# Patient Record
Sex: MALE | Race: WHITE | NOT HISPANIC OR LATINO | URBAN - METROPOLITAN AREA
[De-identification: names, ages, dates, MRNs, and addresses within clinical notes are randomized per-mention and may not be internally consistent; named-entity substitution may affect disease eponyms.]

---

## 2018-04-18 ENCOUNTER — INPATIENT (INPATIENT)
Facility: HOSPITAL | Age: 33
LOS: 4 days | Discharge: HOME | End: 2018-04-23
Attending: INTERNAL MEDICINE | Admitting: INTERNAL MEDICINE

## 2018-04-18 VITALS
SYSTOLIC BLOOD PRESSURE: 162 MMHG | DIASTOLIC BLOOD PRESSURE: 108 MMHG | HEART RATE: 103 BPM | RESPIRATION RATE: 18 BRPM | WEIGHT: 192.02 LBS | TEMPERATURE: 100 F

## 2018-04-18 DIAGNOSIS — K21.9 GASTRO-ESOPHAGEAL REFLUX DISEASE WITHOUT ESOPHAGITIS: ICD-10-CM

## 2018-04-18 DIAGNOSIS — E66.9 OBESITY, UNSPECIFIED: ICD-10-CM

## 2018-04-18 DIAGNOSIS — F10.20 ALCOHOL DEPENDENCE, UNCOMPLICATED: ICD-10-CM

## 2018-04-18 DIAGNOSIS — F13.20 SEDATIVE, HYPNOTIC OR ANXIOLYTIC DEPENDENCE, UNCOMPLICATED: ICD-10-CM

## 2018-04-18 DIAGNOSIS — I10 ESSENTIAL (PRIMARY) HYPERTENSION: ICD-10-CM

## 2018-04-18 LAB
ALBUMIN SERPL ELPH-MCNC: 4.6 G/DL — SIGNIFICANT CHANGE UP (ref 3.5–5.2)
ALP SERPL-CCNC: 101 U/L — SIGNIFICANT CHANGE UP (ref 30–115)
ALT FLD-CCNC: 129 U/L — HIGH (ref 0–41)
AMMONIA BLD-MCNC: 59 UMOL/L — HIGH (ref 11–55)
AMYLASE P1 CFR SERPL: 65 U/L — SIGNIFICANT CHANGE UP (ref 25–115)
ANION GAP SERPL CALC-SCNC: 17 MMOL/L — HIGH (ref 7–14)
APPEARANCE UR: CLEAR — SIGNIFICANT CHANGE UP
APTT BLD: 32.3 SEC — SIGNIFICANT CHANGE UP (ref 27–39.2)
AST SERPL-CCNC: 141 U/L — HIGH (ref 0–41)
BACTERIA # UR AUTO: (no result)
BASOPHILS # BLD AUTO: 0.05 K/UL — SIGNIFICANT CHANGE UP (ref 0–0.2)
BASOPHILS NFR BLD AUTO: 0.5 % — SIGNIFICANT CHANGE UP (ref 0–1)
BILIRUB SERPL-MCNC: 3 MG/DL — HIGH (ref 0.2–1.2)
BILIRUB UR-MCNC: (no result)
BUN SERPL-MCNC: 11 MG/DL — SIGNIFICANT CHANGE UP (ref 10–20)
CALCIUM SERPL-MCNC: 9.7 MG/DL — SIGNIFICANT CHANGE UP (ref 8.5–10.1)
CHLORIDE SERPL-SCNC: 95 MMOL/L — LOW (ref 98–110)
CHOLEST SERPL-MCNC: 175 MG/DL — SIGNIFICANT CHANGE UP (ref 100–200)
CO2 SERPL-SCNC: 26 MMOL/L — SIGNIFICANT CHANGE UP (ref 17–32)
COD CRY URNS QL: NEGATIVE — SIGNIFICANT CHANGE UP
COLOR SPEC: SIGNIFICANT CHANGE UP
CREAT SERPL-MCNC: 0.8 MG/DL — SIGNIFICANT CHANGE UP (ref 0.7–1.5)
DIFF PNL FLD: NEGATIVE — SIGNIFICANT CHANGE UP
EOSINOPHIL # BLD AUTO: 0 K/UL — SIGNIFICANT CHANGE UP (ref 0–0.7)
EOSINOPHIL NFR BLD AUTO: 0 % — SIGNIFICANT CHANGE UP (ref 0–8)
EPI CELLS # UR: (no result) /HPF
ETHANOL SERPL-MCNC: <10 MG/DL — HIGH
GGT SERPL-CCNC: 1109 U/L — HIGH (ref 1–40)
GLUCOSE SERPL-MCNC: 116 MG/DL — HIGH (ref 70–99)
GLUCOSE UR QL: NEGATIVE MG/DL — SIGNIFICANT CHANGE UP
GRAN CASTS # UR COMP ASSIST: NEGATIVE — SIGNIFICANT CHANGE UP
HCT VFR BLD CALC: 37.6 % — LOW (ref 42–52)
HDLC SERPL-MCNC: 58 MG/DL — SIGNIFICANT CHANGE UP (ref 40–125)
HGB BLD-MCNC: 13.5 G/DL — LOW (ref 14–18)
HIV 1 & 2 AB SERPL IA.RAPID: SIGNIFICANT CHANGE UP
HYALINE CASTS # UR AUTO: (no result) /LPF
IMM GRANULOCYTES NFR BLD AUTO: 0.3 % — SIGNIFICANT CHANGE UP (ref 0.1–0.3)
INR BLD: 1.13 RATIO — SIGNIFICANT CHANGE UP (ref 0.65–1.3)
KETONES UR-MCNC: NEGATIVE — SIGNIFICANT CHANGE UP
LEUKOCYTE ESTERASE UR-ACNC: NEGATIVE — SIGNIFICANT CHANGE UP
LIPID PNL WITH DIRECT LDL SERPL: 95 MG/DL — SIGNIFICANT CHANGE UP (ref 4–129)
LYMPHOCYTES # BLD AUTO: 1.19 K/UL — LOW (ref 1.2–3.4)
LYMPHOCYTES # BLD AUTO: 11.4 % — LOW (ref 20.5–51.1)
MAGNESIUM SERPL-MCNC: 1.8 MG/DL — SIGNIFICANT CHANGE UP (ref 1.8–2.4)
MCHC RBC-ENTMCNC: 35.6 PG — HIGH (ref 27–31)
MCHC RBC-ENTMCNC: 35.9 G/DL — SIGNIFICANT CHANGE UP (ref 32–37)
MCV RBC AUTO: 99.2 FL — HIGH (ref 80–94)
MONOCYTES # BLD AUTO: 0.67 K/UL — HIGH (ref 0.1–0.6)
MONOCYTES NFR BLD AUTO: 6.4 % — SIGNIFICANT CHANGE UP (ref 1.7–9.3)
NEUTROPHILS # BLD AUTO: 8.48 K/UL — HIGH (ref 1.4–6.5)
NEUTROPHILS NFR BLD AUTO: 81.4 % — HIGH (ref 42.2–75.2)
NITRITE UR-MCNC: NEGATIVE — SIGNIFICANT CHANGE UP
NRBC # BLD: 0 /100 WBCS — SIGNIFICANT CHANGE UP (ref 0–0)
PH UR: 7.5 — SIGNIFICANT CHANGE UP (ref 5–8)
PLATELET # BLD AUTO: 249 K/UL — SIGNIFICANT CHANGE UP (ref 130–400)
POTASSIUM SERPL-MCNC: 4.3 MMOL/L — SIGNIFICANT CHANGE UP (ref 3.5–5)
POTASSIUM SERPL-SCNC: 4.3 MMOL/L — SIGNIFICANT CHANGE UP (ref 3.5–5)
PROT SERPL-MCNC: 7.6 G/DL — SIGNIFICANT CHANGE UP (ref 6–8)
PROT UR-MCNC: 30 MG/DL
PROTHROM AB SERPL-ACNC: 12.3 SEC — SIGNIFICANT CHANGE UP (ref 9.95–12.87)
RBC # BLD: 3.79 M/UL — LOW (ref 4.7–6.1)
RBC # FLD: 20.3 % — HIGH (ref 11.5–14.5)
RBC CASTS # UR COMP ASSIST: (no result) /HPF
SODIUM SERPL-SCNC: 138 MMOL/L — SIGNIFICANT CHANGE UP (ref 135–146)
SP GR SPEC: 1.01 — SIGNIFICANT CHANGE UP (ref 1.01–1.03)
TOTAL CHOLESTEROL/HDL RATIO MEASUREMENT: 3 RATIO — LOW (ref 4–5.5)
TRI-PHOS CRY UR QL COMP ASSIST: NEGATIVE — SIGNIFICANT CHANGE UP
TRIGL SERPL-MCNC: 155 MG/DL — HIGH (ref 10–149)
URATE CRY FLD QL MICRO: NEGATIVE — SIGNIFICANT CHANGE UP
UROBILINOGEN FLD QL: 2 MG/DL (ref 0.2–0.2)
WBC # BLD: 10.42 K/UL — SIGNIFICANT CHANGE UP (ref 4.8–10.8)
WBC # FLD AUTO: 10.42 K/UL — SIGNIFICANT CHANGE UP (ref 4.8–10.8)
WBC UR QL: SIGNIFICANT CHANGE UP /HPF

## 2018-04-18 RX ORDER — FOLIC ACID 0.8 MG
1 TABLET ORAL DAILY
Qty: 0 | Refills: 0 | Status: DISCONTINUED | OUTPATIENT
Start: 2018-04-18 | End: 2018-04-23

## 2018-04-18 RX ORDER — PANTOPRAZOLE SODIUM 20 MG/1
20 TABLET, DELAYED RELEASE ORAL
Qty: 0 | Refills: 0 | Status: DISCONTINUED | OUTPATIENT
Start: 2018-04-18 | End: 2018-04-23

## 2018-04-18 RX ORDER — ACETAMINOPHEN 500 MG
650 TABLET ORAL EVERY 6 HOURS
Qty: 0 | Refills: 0 | Status: DISCONTINUED | OUTPATIENT
Start: 2018-04-18 | End: 2018-04-23

## 2018-04-18 RX ORDER — PHENOBARBITAL 60 MG
32.4 TABLET ORAL EVERY 12 HOURS
Qty: 0 | Refills: 0 | Status: DISCONTINUED | OUTPATIENT
Start: 2018-04-22 | End: 2018-04-23

## 2018-04-18 RX ORDER — METOPROLOL TARTRATE 50 MG
50 TABLET ORAL DAILY
Qty: 0 | Refills: 0 | Status: DISCONTINUED | OUTPATIENT
Start: 2018-04-18 | End: 2018-04-23

## 2018-04-18 RX ORDER — PHENOBARBITAL 60 MG
64.8 TABLET ORAL EVERY 6 HOURS
Qty: 0 | Refills: 0 | Status: DISCONTINUED | OUTPATIENT
Start: 2018-04-18 | End: 2018-04-19

## 2018-04-18 RX ORDER — HYDROXYZINE HCL 10 MG
100 TABLET ORAL AT BEDTIME
Qty: 0 | Refills: 0 | Status: DISCONTINUED | OUTPATIENT
Start: 2018-04-18 | End: 2018-04-23

## 2018-04-18 RX ORDER — PHENOBARBITAL 60 MG
32.4 TABLET ORAL EVERY 4 HOURS
Qty: 0 | Refills: 0 | Status: DISCONTINUED | OUTPATIENT
Start: 2018-04-18 | End: 2018-04-23

## 2018-04-18 RX ORDER — PHENOBARBITAL 60 MG
TABLET ORAL
Qty: 0 | Refills: 0 | Status: COMPLETED | OUTPATIENT
Start: 2018-04-18 | End: 2018-04-23

## 2018-04-18 RX ORDER — PHENOBARBITAL 60 MG
48.6 TABLET ORAL EVERY 6 HOURS
Qty: 0 | Refills: 0 | Status: DISCONTINUED | OUTPATIENT
Start: 2018-04-19 | End: 2018-04-20

## 2018-04-18 RX ORDER — HYDROXYZINE HCL 10 MG
50 TABLET ORAL EVERY 6 HOURS
Qty: 0 | Refills: 0 | Status: DISCONTINUED | OUTPATIENT
Start: 2018-04-18 | End: 2018-04-23

## 2018-04-18 RX ORDER — IBUPROFEN 200 MG
400 TABLET ORAL EVERY 6 HOURS
Qty: 0 | Refills: 0 | Status: DISCONTINUED | OUTPATIENT
Start: 2018-04-18 | End: 2018-04-23

## 2018-04-18 RX ORDER — THIAMINE MONONITRATE (VIT B1) 100 MG
100 TABLET ORAL DAILY
Qty: 0 | Refills: 0 | Status: DISCONTINUED | OUTPATIENT
Start: 2018-04-18 | End: 2018-04-23

## 2018-04-18 RX ORDER — PHENOBARBITAL 60 MG
32.4 TABLET ORAL EVERY 6 HOURS
Qty: 0 | Refills: 0 | Status: DISCONTINUED | OUTPATIENT
Start: 2018-04-20 | End: 2018-04-21

## 2018-04-18 RX ADMIN — Medication 64.8 MILLIGRAM(S): at 17:07

## 2018-04-18 RX ADMIN — Medication 0.1 MILLIGRAM(S): at 17:08

## 2018-04-18 NOTE — H&P ADULT - HISTORY OF PRESENT ILLNESS
DRUG	AGE OF ONSET	ROUTE	FREQ	AMOUNT	LAST USE	LENGTH OF CURRENT USE	  Xanax	20 Y/O	Po	Daily	5 Sticks	4/18/18 4 Sticks	4 years	  Alcohol	20 Y/O	Po	Daily	½ Bottle Rum	6 Shots	4 Years	  							  							  							      This is 33 Y/O male with Hx of Continous Benzo & Alcohol Dependency.Negative Detox,Rehab,or OPDNegative hx of IVDA  Hx x of Withdrawal Seizures: No   Psyhx: Denies              Denies any S/H Ideation or A/V Hallucination  Screening history	Last tested	Result	History of treatment	  HIV	2017	NEG	N/A	  Hepatitis C	2017	NEG	N/A	  Quantiferon GOLD TB test	2017	NEG	N/A

## 2018-04-18 NOTE — H&P ADULT - NSHPPHYSICALEXAM_GEN_ALL_CORE
-  Vital Signs:      Temp:   99.2   Pulse: 98      RR:  16     BP:  154/98                      Constitutional: anxious A&Ox3, WD/WN  Eyes: PERRLA  Respiratory: +air entry, no rales, no rhonchi, no wheezes  Cardiovascular: +S1 and S2,RRR  Gastrointestinal: +BS, soft, non-tender, not distended, No CVAT  Extremities: no cyanosis, no edema, no calf tenderness,   Vascular: +dorsal pedis and radial pulses, no extremity cyanosis  Neurological: sensation intact, ROM equal B/L, CN II-XII intact, Gait: steady  Skin: no rashes, normal turgor, No track marks ,(+)Abdominal Straie  No Decubiti present  No IV lines present  Rectal/Breasts Exam: Deferred

## 2018-04-18 NOTE — H&P ADULT - PROBLEM SELECTOR PLAN 3
Heart Healthy Diet  Monitor BP q6h  Clonidine 0.1mg PO Q6H PRN for BP >140/90, hold BP <90/60  Continue Home Meds:  Metoprolol 50 Mg po daily

## 2018-04-18 NOTE — H&P ADULT - NSHPREVIEWOFSYSTEMS_GEN_ALL_CORE
REVIEW OF SYSTEMS:    CONSTITUTIONAL: +loss appetitie, +hot and chill intermittently, No weakness or fevers, No weight loss  PAIN (1 to 10 scles):   SLEEPING HABITS: +Insomnia, No KLEBER, Narcolepsy, or Somnambulism, Resltess leg  SKIN: No itching, rashes. pruritus, dryness, hair or nail changes.  EYES/ENT: No visual changes;  No vertigo or throat pain   NECK: No pain or stiffness  LYMPH NODES: No enlarged glands  RESPIRATORY: No cough, wheezing, hemoptysis; No shortness of breath  CARDIOVASCULAR: No chest pain or palpitations  BREASTS: No pain, masses, or nipple discharge   ENDOCRINE: No heat or cold intolerance; No hair loss  GASTROINTESTINAL: No Nausea or diarrhea in earlier, No abdominal pain. No vomiting, or hematemesis;  No melena or    hematochezia.  GENITOURINARY: No dysuria, frequency or hematuria, No penile discharge or testicular pain  NEUROLOGICAL: No numbness or weakness  MUSCULOSKELETAL: No arthritis, +myalgias, No joint and muscle stiffness  PSYCHIATRIC: Denies any Psych. Hx No mood swings, or difficulty sleeping, Denies S/H ideation, Denies AVH  Others:

## 2018-04-18 NOTE — H&P ADULT - ATTENDING COMMENTS
Patient interviewed and examined.    Chart reviewed.    PA's H&P noted and modified, as appropriate.    Case discussed on team rounds    Following is my summary of the case.    Admitted for detox: from ____ED, _x__Intake, ____Med/Surg Floor    Alcohol_x___   Opioid_____  Benzo_x__ Other_____    Substance amount, duration of use, last usage, and prior attempts at detox or rehabs, are outlined above in the H&P and discussed with patient.    Associated withdrawal symptoms presents.  Comorbid conditions noted. Chronic and Stable.    Past Medical Hx, Psych Hx, family Hx, Social Hx from H&P reviewed and NO changes.    Old medical record and medication Hx. Reviewed    Following items reviewed and addressed:  1. labs  2. EKG  3. Imaging from PACs module    Examination: no change from PA's exam.    Place on following protocol  __x___Medically Managed  ____Medically Supervised    Ciwa_____Librium taper____Ativan taper___Methadone taper___ Phenobarb taper_x___ Suboxone Induction____MMTP____    Narcan Kit Offered    Psych Consult __X__N/A  ___Ordered    Physical Therapy  ___X  N/A  ___  Ordered    Aftercare disposition to be addressed by counselors.    Estimated length of stay 3-5 days.

## 2018-04-19 LAB
AMPHET UR-MCNC: NEGATIVE — SIGNIFICANT CHANGE UP
BARBITURATES UR SCN-MCNC: NEGATIVE — SIGNIFICANT CHANGE UP
BENZODIAZ UR-MCNC: POSITIVE
COCAINE METAB.OTHER UR-MCNC: NEGATIVE — SIGNIFICANT CHANGE UP
DRUG SCREEN 1, URINE RESULT: SIGNIFICANT CHANGE UP
ESTIMATED AVERAGE GLUCOSE: 123 MG/DL — HIGH (ref 68–114)
HAV IGM SER-ACNC: SIGNIFICANT CHANGE UP
HBA1C BLD-MCNC: 5.9 % — HIGH (ref 4–5.6)
HBV CORE IGM SER-ACNC: SIGNIFICANT CHANGE UP
HBV SURFACE AG SER-ACNC: SIGNIFICANT CHANGE UP
HCV AB S/CO SERPL IA: 0.15 S/CO — SIGNIFICANT CHANGE UP
HCV AB SERPL-IMP: SIGNIFICANT CHANGE UP
METHADONE UR-MCNC: NEGATIVE — SIGNIFICANT CHANGE UP
OPIATES UR-MCNC: NEGATIVE — SIGNIFICANT CHANGE UP
PCP UR-MCNC: NEGATIVE — SIGNIFICANT CHANGE UP
PROPOXYPHENE QUALITATIVE URINE RESULT: NEGATIVE — SIGNIFICANT CHANGE UP
T PALLIDUM AB TITR SER: NEGATIVE — SIGNIFICANT CHANGE UP
THC UR QL: NEGATIVE — SIGNIFICANT CHANGE UP

## 2018-04-19 RX ADMIN — Medication 50 MILLIGRAM(S): at 09:15

## 2018-04-19 RX ADMIN — Medication 48.6 MILLIGRAM(S): at 17:34

## 2018-04-19 RX ADMIN — PANTOPRAZOLE SODIUM 20 MILLIGRAM(S): 20 TABLET, DELAYED RELEASE ORAL at 05:49

## 2018-04-19 RX ADMIN — Medication 64.8 MILLIGRAM(S): at 00:02

## 2018-04-19 RX ADMIN — Medication 100 MILLIGRAM(S): at 09:15

## 2018-04-19 RX ADMIN — Medication 32.4 MILLIGRAM(S): at 22:11

## 2018-04-19 RX ADMIN — Medication 1 TABLET(S): at 09:15

## 2018-04-19 RX ADMIN — Medication 1 MILLIGRAM(S): at 09:14

## 2018-04-19 RX ADMIN — Medication 48.6 MILLIGRAM(S): at 05:49

## 2018-04-19 RX ADMIN — Medication 48.6 MILLIGRAM(S): at 12:01

## 2018-04-19 RX ADMIN — Medication 100 MILLIGRAM(S): at 00:02

## 2018-04-20 RX ADMIN — Medication 50 MILLIGRAM(S): at 08:53

## 2018-04-20 RX ADMIN — Medication 32.4 MILLIGRAM(S): at 16:23

## 2018-04-20 RX ADMIN — PANTOPRAZOLE SODIUM 20 MILLIGRAM(S): 20 TABLET, DELAYED RELEASE ORAL at 05:48

## 2018-04-20 RX ADMIN — Medication 32.4 MILLIGRAM(S): at 05:47

## 2018-04-20 RX ADMIN — Medication 100 MILLIGRAM(S): at 08:53

## 2018-04-20 RX ADMIN — Medication 0.1 MILLIGRAM(S): at 20:46

## 2018-04-20 RX ADMIN — Medication 32.4 MILLIGRAM(S): at 22:20

## 2018-04-20 RX ADMIN — Medication 32.4 MILLIGRAM(S): at 12:29

## 2018-04-20 RX ADMIN — Medication 100 MILLIGRAM(S): at 00:20

## 2018-04-20 RX ADMIN — Medication 1 MILLIGRAM(S): at 08:53

## 2018-04-20 RX ADMIN — Medication 48.6 MILLIGRAM(S): at 00:19

## 2018-04-20 RX ADMIN — Medication 32.4 MILLIGRAM(S): at 17:31

## 2018-04-20 RX ADMIN — Medication 30 MILLILITER(S): at 18:17

## 2018-04-20 RX ADMIN — Medication 1 TABLET(S): at 08:53

## 2018-04-20 NOTE — CHART NOTE - NSCHARTNOTEFT_GEN_A_CORE
Subsequent Inpatient Encounter                                       Detox Unit    OMAR MONACO   32y   Male      Chief Complaint:    Follow up for Benzodiazipine  Dependency    HPI:     I reviewed previous notes.No Change, except if noted below.             Detail:_    ROS:   I reviewed with patient.  No changes from previous notes except if noted below.             Detail: _    PFSH I reviewed with patient. No changes from previous notes except if noted below.             Detail_    Medication reconciliation performed.    MEDICATIONS  (STANDING):  folic acid 1 milliGRAM(s) Oral daily  metoprolol succinate ER 50 milliGRAM(s) Oral daily  multivitamin 1 Tablet(s) Oral daily  pantoprazole    Tablet 20 milliGRAM(s) Oral before breakfast  PHENobarbital   Oral   PHENobarbital 32.4 milliGRAM(s) Oral every 6 hours  thiamine 100 milliGRAM(s) Oral daily      MEDICATIONS  (PRN):  acetaminophen   Tablet 650 milliGRAM(s) Oral every 6 hours PRN For Temp greater than 38.5 C (101.3 F)  acetaminophen   Tablet. 650 milliGRAM(s) Oral every 6 hours PRN Severe Pain (7 - 10)  aluminum hydroxide/magnesium hydroxide/simethicone Suspension 30 milliLiter(s) Oral every 4 hours PRN Dyspepsia  cloNIDine 0.1 milliGRAM(s) Oral every 6 hours PRN for BP >140/90  hydrOXYzine hydrochloride 50 milliGRAM(s) Oral every 6 hours PRN Anxiety  hydrOXYzine hydrochloride 100 milliGRAM(s) Oral at bedtime PRN Insomnia  ibuprofen  Tablet 400 milliGRAM(s) Oral every 6 hours PRN Mild pain  PHENobarbital 32.4 milliGRAM(s) Oral every 4 hours PRN Withdrawal      T(C): 35.9 (18 @ 06:19), Max: 37 (18 @ 00:07)  HR: 80 (18 @ 06:19) (80 - 101)  BP: 128/77 (18 @ 06:19) (128/77 - 134/97)  RR: 16 (18 @ 06:19) (16 - 17)  SpO2: --    PHYSICAL EXAM:      Constitutional: NAD, A&O x3    Eyes: PERRLA, no conjuctivitis    Neck: no lymphadenopathy    Respiratory: +air entry, no rales, no rhonchi, no wheezes    Cardiovascular: +S1 and S2, regular rate and rhythm    Gastrointestinal: +BS, soft, non-tender, not distended    Extremities:  no edema, no calf tenderness    Skin: no rashes, normal turgor                            13.5   10.42 )-----------( 249      ( 2018 18:51 )             37.6       138  |  95<L>  |  11  ----------------------------<  116<H>  4.3   |  26  |  0.8    Ca    9.7      2018 18:51  Mg     1.8         TPro  7.6  /  Alb  4.6  /  TBili  3.0<H>  /  DBili  x   /  AST  141<H>  /  ALT  129<H>  /  AlkPhos  101    PT/INR - ( 2018 18:51 )   PT: 12.30 sec;   INR: 1.13 ratio         PTT - ( 2018 18:51 )  PTT:32.3 sec  Magnesium, Serum: 1.8 mg/dL (18 @ 18:51)  Ammonia, Serum: 59 umol/L (18 @ 18:51)  Amylase, Serum Total: 65 U/L (18 @ 18:51)  Hemoglobin A1C, Whole Blood: 5.9 % (18 @ 18:51)  Treponema Pallidum Antibody Interpretation: Negative (18 @ 18:51)  Hepatitis B Surface Antigen: Nonreact (18 @ 18:51)  Hepatitis C Virus S/CO Ratio: 0.15 S/CO (18 @ 18:51)    Hepatitis C Virus Interpretation: Nonreact (18 @ 18:51)      Urinalysis Basic - ( 2018 16:24 )    Color: Erica / Appearance: Clear / S.015 / pH: x  Gluc: x / Ketone: Negative  / Bili: Small / Urobili: 2.0 mg/dL   Blood: x / Protein: 30 mg/dL / Nitrite: Negative   Leuk Esterase: Negative / RBC: 5-10 /HPF / WBC 3-5 /HPF   Sq Epi: x / Non Sq Epi: Few /HPF / Bacteria: Few    Drug Screen 1, Urine Result: Done (18 @ 16:24)        Impression and Plan:    Primary Diagnosis:  Benzodiazipine Dependency                                Medication: Phenobarbitol Protocol    Secondary Diagnosis:                                                                                Medication:    Tertiary Diagnosis:                                                                                     Medication:      Continue Detox Protocols. Use of PRNS as needed for withdrawal and comfort.    Adjustments to protocols:    Labs/ Tests reviewed.    Tests ordered:     Likely Disposition: _X__Home       ___Rehab       ___Outpatient Program    ___Self Help     _____Other    Estimated Length of stay:_5___

## 2018-04-21 LAB
M TB TUBERC IFN-G BLD QL: 0 IU/ML — SIGNIFICANT CHANGE UP
M TB TUBERC IFN-G BLD QL: 0.02 IU/ML — SIGNIFICANT CHANGE UP
M TB TUBERC IFN-G BLD QL: NEGATIVE — SIGNIFICANT CHANGE UP
MITOGEN IGNF BCKGRD COR BLD-ACNC: 1.28 IU/ML — SIGNIFICANT CHANGE UP

## 2018-04-21 RX ORDER — GABAPENTIN 400 MG/1
300 CAPSULE ORAL THREE TIMES A DAY
Qty: 0 | Refills: 0 | Status: DISCONTINUED | OUTPATIENT
Start: 2018-04-21 | End: 2018-04-23

## 2018-04-21 RX ADMIN — Medication 50 MILLIGRAM(S): at 17:30

## 2018-04-21 RX ADMIN — Medication 100 MILLIGRAM(S): at 00:16

## 2018-04-21 RX ADMIN — GABAPENTIN 300 MILLIGRAM(S): 400 CAPSULE ORAL at 08:44

## 2018-04-21 RX ADMIN — Medication 32.4 MILLIGRAM(S): at 06:34

## 2018-04-21 RX ADMIN — Medication 1 MILLIGRAM(S): at 08:27

## 2018-04-21 RX ADMIN — PANTOPRAZOLE SODIUM 20 MILLIGRAM(S): 20 TABLET, DELAYED RELEASE ORAL at 06:34

## 2018-04-21 RX ADMIN — Medication 1 TABLET(S): at 08:27

## 2018-04-21 RX ADMIN — Medication 32.4 MILLIGRAM(S): at 17:30

## 2018-04-21 RX ADMIN — Medication 32.4 MILLIGRAM(S): at 00:16

## 2018-04-21 RX ADMIN — Medication 100 MILLIGRAM(S): at 08:27

## 2018-04-21 RX ADMIN — Medication 32.4 MILLIGRAM(S): at 12:29

## 2018-04-21 RX ADMIN — Medication 50 MILLIGRAM(S): at 08:27

## 2018-04-21 RX ADMIN — GABAPENTIN 300 MILLIGRAM(S): 400 CAPSULE ORAL at 21:14

## 2018-04-21 RX ADMIN — GABAPENTIN 300 MILLIGRAM(S): 400 CAPSULE ORAL at 12:29

## 2018-04-21 NOTE — CHART NOTE - NSCHARTNOTEFT_GEN_A_CORE
Subsequent Inpatient Encounter                                       Detox Unit    OMAR MONACO   32y   Male      Chief Complaint:    Follow up for Benzodiazipine  Dependency    HPI:     I reviewed previous notes.No Change, except if noted below.             Detail:_    ROS:   I reviewed with patient.  No changes from previous notes except if noted below.             Detail: _    PFSH I reviewed with patient. No changes from previous notes except if noted below.             Detail_    Medication reconciliation performed.    MEDICATIONS  (STANDING):  folic acid 1 milliGRAM(s) Oral daily  metoprolol succinate ER 50 milliGRAM(s) Oral daily  multivitamin 1 Tablet(s) Oral daily  pantoprazole    Tablet 20 milliGRAM(s) Oral before breakfast  PHENobarbital   Oral   PHENobarbital 32.4 milliGRAM(s) Oral every 6 hours  thiamine 100 milliGRAM(s) Oral daily      MEDICATIONS  (PRN):  acetaminophen   Tablet 650 milliGRAM(s) Oral every 6 hours PRN For Temp greater than 38.5 C (101.3 F)  acetaminophen   Tablet. 650 milliGRAM(s) Oral every 6 hours PRN Severe Pain (7 - 10)  aluminum hydroxide/magnesium hydroxide/simethicone Suspension 30 milliLiter(s) Oral every 4 hours PRN Dyspepsia  cloNIDine 0.1 milliGRAM(s) Oral every 6 hours PRN for BP >140/90  hydrOXYzine hydrochloride 50 milliGRAM(s) Oral every 6 hours PRN Anxiety  hydrOXYzine hydrochloride 100 milliGRAM(s) Oral at bedtime PRN Insomnia  ibuprofen  Tablet 400 milliGRAM(s) Oral every 6 hours PRN Mild pain  PHENobarbital 32.4 milliGRAM(s) Oral every 4 hours PRN Withdrawal      T(C): 35.9 (18 @ 06:19), Max: 37 (18 @ 00:07)  HR: 80 (18 @ 06:19) (80 - 101)  BP: 128/77 (18 @ 06:19) (128/77 - 134/97)  RR: 16 (18 @ 06:19) (16 - 17)  SpO2: --    PHYSICAL EXAM:      Constitutional: NAD, A&O x3    Eyes: PERRLA, no conjuctivitis    Neck: no lymphadenopathy    Respiratory: +air entry, no rales, no rhonchi, no wheezes    Cardiovascular: +S1 and S2, regular rate and rhythm    Gastrointestinal: +BS, soft, non-tender, not distended    Extremities:  no edema, no calf tenderness    Skin: no rashes, normal turgor                            13.5   10.42 )-----------( 249      ( 2018 18:51 )             37.6       138  |  95<L>  |  11  ----------------------------<  116<H>  4.3   |  26  |  0.8    Ca    9.7      2018 18:51  Mg     1.8         TPro  7.6  /  Alb  4.6  /  TBili  3.0<H>  /  DBili  x   /  AST  141<H>  /  ALT  129<H>  /  AlkPhos  101    PT/INR - ( 2018 18:51 )   PT: 12.30 sec;   INR: 1.13 ratio         PTT - ( 2018 18:51 )  PTT:32.3 sec  Magnesium, Serum: 1.8 mg/dL (18 @ 18:51)  Ammonia, Serum: 59 umol/L (18 @ 18:51)  Amylase, Serum Total: 65 U/L (18 @ 18:51)  Hemoglobin A1C, Whole Blood: 5.9 % (18 @ 18:51)  Treponema Pallidum Antibody Interpretation: Negative (18 @ 18:51)  Hepatitis B Surface Antigen: Nonreact (18 @ 18:51)  Hepatitis C Virus S/CO Ratio: 0.15 S/CO (18 @ 18:51)    Hepatitis C Virus Interpretation: Nonreact (18 @ 18:51)      Urinalysis Basic - ( 2018 16:24 )    Color: Erica / Appearance: Clear / S.015 / pH: x  Gluc: x / Ketone: Negative  / Bili: Small / Urobili: 2.0 mg/dL   Blood: x / Protein: 30 mg/dL / Nitrite: Negative   Leuk Esterase: Negative / RBC: 5-10 /HPF / WBC 3-5 /HPF   Sq Epi: x / Non Sq Epi: Few /HPF / Bacteria: Few    Drug Screen 1, Urine Result: Done (18 @ 16:24)        Impression and Plan:    Primary Diagnosis:  Benzodiazipine Dependency                                Medication: Phenobarbitol Protocol    Secondary Diagnosis:        Anxiety/WD                                                    Medication: trial gabpentin    Tertiary Diagnosis:                                                                                     Medication:      Continue Detox Protocols. Use of PRNS as needed for withdrawal and comfort.    Adjustments to protocols:    Labs/ Tests reviewed.    Tests ordered:     Likely Disposition: _X__Home       ___Rehab       ___Outpatient Program    ___Self Help     _____Other    Estimated Length of stay:_5___

## 2018-04-22 RX ORDER — PANTOPRAZOLE SODIUM 20 MG/1
1 TABLET, DELAYED RELEASE ORAL
Qty: 0 | Refills: 0 | COMMUNITY

## 2018-04-22 RX ORDER — GABAPENTIN 400 MG/1
1 CAPSULE ORAL
Qty: 90 | Refills: 0 | OUTPATIENT
Start: 2018-04-22

## 2018-04-22 RX ORDER — METOPROLOL TARTRATE 50 MG
1 TABLET ORAL
Qty: 0 | Refills: 0 | COMMUNITY
Start: 2018-04-22

## 2018-04-22 RX ORDER — PANTOPRAZOLE SODIUM 20 MG/1
1 TABLET, DELAYED RELEASE ORAL
Qty: 0 | Refills: 0 | COMMUNITY
Start: 2018-04-22

## 2018-04-22 RX ORDER — METOPROLOL TARTRATE 50 MG
1 TABLET ORAL
Qty: 0 | Refills: 0 | COMMUNITY

## 2018-04-22 RX ADMIN — Medication 1 TABLET(S): at 08:21

## 2018-04-22 RX ADMIN — Medication 0.1 MILLIGRAM(S): at 00:39

## 2018-04-22 RX ADMIN — Medication 32.4 MILLIGRAM(S): at 05:58

## 2018-04-22 RX ADMIN — Medication 100 MILLIGRAM(S): at 08:21

## 2018-04-22 RX ADMIN — Medication 50 MILLIGRAM(S): at 14:11

## 2018-04-22 RX ADMIN — Medication 100 MILLIGRAM(S): at 00:39

## 2018-04-22 RX ADMIN — PANTOPRAZOLE SODIUM 20 MILLIGRAM(S): 20 TABLET, DELAYED RELEASE ORAL at 06:57

## 2018-04-22 RX ADMIN — GABAPENTIN 300 MILLIGRAM(S): 400 CAPSULE ORAL at 12:03

## 2018-04-22 RX ADMIN — Medication 1 MILLIGRAM(S): at 08:21

## 2018-04-22 RX ADMIN — GABAPENTIN 300 MILLIGRAM(S): 400 CAPSULE ORAL at 20:25

## 2018-04-22 RX ADMIN — Medication 50 MILLIGRAM(S): at 08:21

## 2018-04-22 RX ADMIN — Medication 32.4 MILLIGRAM(S): at 17:41

## 2018-04-22 RX ADMIN — GABAPENTIN 300 MILLIGRAM(S): 400 CAPSULE ORAL at 08:21

## 2018-04-22 NOTE — CHART NOTE - NSCHARTNOTEFT_GEN_A_CORE
Allergies:  penicillins      Diet: Regular    Activity: as tolerated    Follow up with    1. PMD in 2 weeks    2. Psych in 2 weeks    3.    Follow up for abnormal labs/tests    1.    Extra Instructions:      Flu Vaccine given  Yes_____         No______      Diagnosis:  Chemical Dependency   Maintain sobriety  refrain from all use      Patient Signature___________________________________________  Date_________________      Nurse Signature_____________________________________________Date_________________

## 2018-04-23 ENCOUNTER — INPATIENT (INPATIENT)
Facility: HOSPITAL | Age: 33
LOS: 2 days | Discharge: PSYCHIATRIC OR SUBSTANCE USE FACILITY | End: 2018-04-26
Attending: INTERNAL MEDICINE | Admitting: INTERNAL MEDICINE

## 2018-04-23 VITALS
HEIGHT: 72 IN | WEIGHT: 192.02 LBS | RESPIRATION RATE: 16 BRPM | HEART RATE: 94 BPM | TEMPERATURE: 99 F | SYSTOLIC BLOOD PRESSURE: 154 MMHG | DIASTOLIC BLOOD PRESSURE: 99 MMHG

## 2018-04-23 VITALS
TEMPERATURE: 96 F | DIASTOLIC BLOOD PRESSURE: 89 MMHG | SYSTOLIC BLOOD PRESSURE: 140 MMHG | HEART RATE: 84 BPM | RESPIRATION RATE: 16 BRPM

## 2018-04-23 DIAGNOSIS — K21.9 GASTRO-ESOPHAGEAL REFLUX DISEASE WITHOUT ESOPHAGITIS: ICD-10-CM

## 2018-04-23 DIAGNOSIS — I10 ESSENTIAL (PRIMARY) HYPERTENSION: ICD-10-CM

## 2018-04-23 DIAGNOSIS — F19.10 OTHER PSYCHOACTIVE SUBSTANCE ABUSE, UNCOMPLICATED: ICD-10-CM

## 2018-04-23 DIAGNOSIS — F41.9 ANXIETY DISORDER, UNSPECIFIED: ICD-10-CM

## 2018-04-23 DIAGNOSIS — F11.20 OPIOID DEPENDENCE, UNCOMPLICATED: ICD-10-CM

## 2018-04-23 PROBLEM — F10.20 ALCOHOL DEPENDENCE, UNCOMPLICATED: Chronic | Status: ACTIVE | Noted: 2018-04-18

## 2018-04-23 PROBLEM — E66.9 OBESITY, UNSPECIFIED: Chronic | Status: ACTIVE | Noted: 2018-04-18

## 2018-04-23 RX ORDER — ACETAMINOPHEN 500 MG
650 TABLET ORAL EVERY 6 HOURS
Qty: 0 | Refills: 0 | Status: DISCONTINUED | OUTPATIENT
Start: 2018-04-23 | End: 2018-04-26

## 2018-04-23 RX ORDER — PANTOPRAZOLE SODIUM 20 MG/1
40 TABLET, DELAYED RELEASE ORAL
Qty: 0 | Refills: 0 | Status: DISCONTINUED | OUTPATIENT
Start: 2018-04-23 | End: 2018-04-26

## 2018-04-23 RX ORDER — METOPROLOL TARTRATE 50 MG
50 TABLET ORAL DAILY
Qty: 0 | Refills: 0 | Status: DISCONTINUED | OUTPATIENT
Start: 2018-04-23 | End: 2018-04-25

## 2018-04-23 RX ORDER — GABAPENTIN 400 MG/1
1 CAPSULE ORAL
Qty: 0 | Refills: 0 | COMMUNITY
Start: 2018-04-23

## 2018-04-23 RX ORDER — GABAPENTIN 400 MG/1
300 CAPSULE ORAL THREE TIMES A DAY
Qty: 0 | Refills: 0 | Status: DISCONTINUED | OUTPATIENT
Start: 2018-04-23 | End: 2018-04-26

## 2018-04-23 RX ORDER — GABAPENTIN 400 MG/1
300 CAPSULE ORAL ONCE
Qty: 0 | Refills: 0 | Status: COMPLETED | OUTPATIENT
Start: 2018-04-23 | End: 2018-04-23

## 2018-04-23 RX ORDER — HYDROXYZINE HCL 10 MG
100 TABLET ORAL AT BEDTIME
Qty: 0 | Refills: 0 | Status: DISCONTINUED | OUTPATIENT
Start: 2018-04-23 | End: 2018-04-26

## 2018-04-23 RX ORDER — PANTOPRAZOLE SODIUM 20 MG/1
40 TABLET, DELAYED RELEASE ORAL
Qty: 0 | Refills: 0 | Status: DISCONTINUED | OUTPATIENT
Start: 2018-04-23 | End: 2018-04-23

## 2018-04-23 RX ORDER — IBUPROFEN 200 MG
600 TABLET ORAL EVERY 8 HOURS
Qty: 0 | Refills: 0 | Status: DISCONTINUED | OUTPATIENT
Start: 2018-04-23 | End: 2018-04-26

## 2018-04-23 RX ADMIN — PANTOPRAZOLE SODIUM 40 MILLIGRAM(S): 20 TABLET, DELAYED RELEASE ORAL at 09:00

## 2018-04-23 RX ADMIN — Medication 50 MILLIGRAM(S): at 09:00

## 2018-04-23 RX ADMIN — Medication 100 MILLIGRAM(S): at 00:44

## 2018-04-23 RX ADMIN — Medication 600 MILLIGRAM(S): at 20:59

## 2018-04-23 RX ADMIN — Medication 100 MILLIGRAM(S): at 09:00

## 2018-04-23 RX ADMIN — PANTOPRAZOLE SODIUM 20 MILLIGRAM(S): 20 TABLET, DELAYED RELEASE ORAL at 06:04

## 2018-04-23 RX ADMIN — GABAPENTIN 300 MILLIGRAM(S): 400 CAPSULE ORAL at 09:00

## 2018-04-23 RX ADMIN — Medication 100 MILLIGRAM(S): at 23:07

## 2018-04-23 RX ADMIN — Medication 1 TABLET(S): at 09:00

## 2018-04-23 RX ADMIN — GABAPENTIN 300 MILLIGRAM(S): 400 CAPSULE ORAL at 14:45

## 2018-04-23 RX ADMIN — Medication 650 MILLIGRAM(S): at 14:48

## 2018-04-23 RX ADMIN — Medication 1 MILLIGRAM(S): at 09:00

## 2018-04-23 RX ADMIN — Medication 32.4 MILLIGRAM(S): at 06:04

## 2018-04-23 RX ADMIN — GABAPENTIN 300 MILLIGRAM(S): 400 CAPSULE ORAL at 20:58

## 2018-04-23 NOTE — H&P ADULT - NSHPPHYSICALEXAM_GEN_ALL_CORE
GENERAL:  31y/o Male NAD, resting comfortably.  HEAD:  Atraumatic, Normocephalic  EYES: EOMI, PERRLA, conjunctiva and sclera clear  NECK: Supple, No JVD, no cervical lymphadenopathy, non-tender  CHEST/LUNG: Clear to auscultation bilaterally; No wheeze, rhonchi, or rales  HEART: Regular rate and rhythm; S1&S2  ABDOMEN: Soft, Nontender, Nondistended x 4 quadrants; Bowel sounds present  EXTREMITIES:   Peripheral Pulses Present, No clubbing, no cyanosis, or no edema, no calf tenderness  PSYCH: AAOx3, cooperative, appropriate  NEUROLOGY: WNL  SKIN: WNL

## 2018-04-23 NOTE — H&P ADULT - HISTORY OF PRESENT ILLNESS
33 y/o male presents to rehab unit from inpatient detox. Patient states abusing ETOH and BZD for 3-4 months daily.   patient states abusing about a pint of alcohol daily and 3-5 xanax daily. Patient denies Seizure, denies DAVIDA, denies tremors.  Patient denies any new medical complaints. No N/V. No chest pain. No dyspnea.

## 2018-04-24 RX ADMIN — Medication 50 MILLIGRAM(S): at 08:25

## 2018-04-24 RX ADMIN — GABAPENTIN 300 MILLIGRAM(S): 400 CAPSULE ORAL at 08:25

## 2018-04-24 RX ADMIN — Medication 100 MILLIGRAM(S): at 22:56

## 2018-04-24 RX ADMIN — PANTOPRAZOLE SODIUM 40 MILLIGRAM(S): 20 TABLET, DELAYED RELEASE ORAL at 08:25

## 2018-04-24 RX ADMIN — GABAPENTIN 300 MILLIGRAM(S): 400 CAPSULE ORAL at 21:27

## 2018-04-24 RX ADMIN — GABAPENTIN 300 MILLIGRAM(S): 400 CAPSULE ORAL at 15:31

## 2018-04-25 DIAGNOSIS — F10.20 ALCOHOL DEPENDENCE, UNCOMPLICATED: ICD-10-CM

## 2018-04-25 DIAGNOSIS — F32.3 MAJOR DEPRESSIVE DISORDER, SINGLE EPISODE, SEVERE WITH PSYCHOTIC FEATURES: ICD-10-CM

## 2018-04-25 DIAGNOSIS — F13.20 SEDATIVE, HYPNOTIC OR ANXIOLYTIC DEPENDENCE, UNCOMPLICATED: ICD-10-CM

## 2018-04-25 RX ORDER — METOPROLOL TARTRATE 50 MG
100 TABLET ORAL DAILY
Qty: 0 | Refills: 0 | Status: DISCONTINUED | OUTPATIENT
Start: 2018-04-25 | End: 2018-04-26

## 2018-04-25 RX ORDER — FLUOXETINE HCL 10 MG
20 CAPSULE ORAL DAILY
Qty: 0 | Refills: 0 | Status: DISCONTINUED | OUTPATIENT
Start: 2018-04-25 | End: 2018-04-26

## 2018-04-25 RX ORDER — RISPERIDONE 4 MG/1
1 TABLET ORAL AT BEDTIME
Qty: 0 | Refills: 0 | Status: DISCONTINUED | OUTPATIENT
Start: 2018-04-25 | End: 2018-04-26

## 2018-04-25 RX ADMIN — PANTOPRAZOLE SODIUM 40 MILLIGRAM(S): 20 TABLET, DELAYED RELEASE ORAL at 08:59

## 2018-04-25 RX ADMIN — GABAPENTIN 300 MILLIGRAM(S): 400 CAPSULE ORAL at 08:59

## 2018-04-25 RX ADMIN — Medication 100 MILLIGRAM(S): at 23:05

## 2018-04-25 RX ADMIN — Medication 50 MILLIGRAM(S): at 08:59

## 2018-04-25 RX ADMIN — GABAPENTIN 300 MILLIGRAM(S): 400 CAPSULE ORAL at 20:53

## 2018-04-25 RX ADMIN — RISPERIDONE 1 MILLIGRAM(S): 4 TABLET ORAL at 20:53

## 2018-04-25 RX ADMIN — GABAPENTIN 300 MILLIGRAM(S): 400 CAPSULE ORAL at 14:32

## 2018-04-25 NOTE — BEHAVIORAL HEALTH ASSESSMENT NOTE - NSBHSOCIALHXDETAILSFT_PSY_A_CORE
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

## 2018-04-25 NOTE — BEHAVIORAL HEALTH ASSESSMENT NOTE - HPI (INCLUDE ILLNESS QUALITY, SEVERITY, DURATION, TIMING, CONTEXT, MODIFYING FACTORS, ASSOCIATED SIGNS AND SYMPTOMS)
31 yo single  male with ho etoh/sed hyp use presents to rehab. Pt reports etoh use started age 12, but regular use didn't start at age 20 but states "it was a weekend thing" and mj use started at that time. Pt reports substance use was "never daily" until mid20s when he moved to [facility]. Pt reports at that point he was living on his own for the first time and "kept liquor in freezer". Pt reports it was 2008 and he was working as an  and the downturn in economy happened and moved to [facility] "for a change". Use escalated at that point and approx 4 yrs ago ISO new relationship. Pt states the relationship was "very stressful and she was prescribed xanax. When she got her script she would just give me half right away". Pt began opiate use started approx 15 yrs ago with oxycodone. Pt first sought treatment with an outpt suboxone treatment 10 years ago. Pt reports following suboxone use, "I never had the urge anymore" and reports not resuming opiate use. This is pt's first inpt tx. When asked about stretches of sobriety pt states "it never really stopped. It was always on and off". When asked to quantify his etoh use, pt states he drank "2-3 times a week" and was "down to 2 bars of xanax a day".   On unit, pt noted to be appearing odd in behavior, noted to be telling roommate that he felt that he would be robbed on unit by a particular peer and reports being ready to fight peer if needed. During this assessment, pt noted to be anxious and have great difficulty relaying personal history. Pt denies past IPP, past psych med trials, reports 1-2 brief attempts at outpt treatment but never consistent, denies past suicide attempts, denies SI. Pt reports poor sleep attributed to worry on this unit, pt states 'I don't know" how appetite is "they just serve breakfast lunch and dinner", reports social life as "I never really thought about it, but I don't have that many friends", pt became very tearful discussing death of friends, poor concentration, denies PDW/SI, poor energy, anhedonia. Pt told story of how he feels others on unit may be trying to harm him. States that today he felt that others were commenting on his actions while he was putting together his burger "look at him putting the ketchup on it". Reports he remains ready to defend himself but has no intention of proactively harming others. Does not feel that his roommate wants to harm him and actually feels safe with him and feels that he will protect him. Pt states that he feels others may be trying to jack him on the unit but when asked, denies that he has anything of value on him or in his room. Story seems delusional. 33 yo single  male with ho etoh/sed hyp use presents to rehab. Pt reports etoh use started age 12, but regular use didn't start at age 20 but states "it was a weekend thing" and mj use started at that time. Pt reports substance use was "never daily" until mid20s when he moved to [facility]. Pt reports at that point he was living on his own for the first time and "kept liquor in freezer". Pt reports it was 2008 and he was working as an  and the downturn in economy happened and moved to [facility] "for a change". Use escalated at that point and approx 4 yrs ago ISO new relationship. Pt states the relationship was "very stressful and she was prescribed xanax. When she got her script she would just give me half right away". Pt began opiate use started approx 15 yrs ago with oxycodone. Pt first sought treatment with an outpt suboxone treatment 10 years ago. Pt reports following suboxone use, "I never had the urge anymore" and reports not resuming opiate use. This is pt's first inpt tx. When asked about stretches of sobriety pt states "it never really stopped. It was always on and off". When asked to quantify his etoh use, pt states he drank "2-3 times a week" and was "down to 2 bars of xanax a day".   On unit, pt noted to be appearing odd in behavior, noted to be telling roommate that he felt that he would be robbed on unit by a particular peer and reports being ready to fight peer if needed. During this assessment, pt noted to be anxious and have great difficulty relaying personal history. Pt denies past IPP, past psych med trials, reports 1-2 brief attempts at outpt treatment but never consistent, denies past suicide attempts, denies SI. Pt reports poor sleep attributed to worry on this unit, pt states 'I don't know" how appetite is "they just serve breakfast lunch and dinner", reports social life as "I never really thought about it, but I don't have that many friends", pt became very tearful discussing death of friends, poor concentration, denies PDW/SI, poor energy, anhedonia. Pt told story of how he feels others on unit may be trying to harm him. States that today he felt that others were commenting on his actions while he was putting together his burger "look at him putting the ketchup on it". Reports he remains ready to defend himself but has no intention of proactively harming others. Does not feel that his roommate wants to harm him and actually feels safe with him and feels that he will protect him. Admits that before writer came to knock on door that he had been listening behind the closed door of his room as he was expecting to have someone come in and harm him. Pt states that he feels others may be trying to jack him on the unit but when asked, denies that he has anything of value on him or in his room. Story seems delusional. Pt at times noted to be whispering at times to make sure others don't hear us discussing his issues and would stop periodically because he thought he heard someone outside.

## 2018-04-25 NOTE — BEHAVIORAL HEALTH ASSESSMENT NOTE - SUMMARY
33 yo SM w sed-hyp/etoh use disorders, denies psych history presents to rehab from detox unit. This is pt's first time in treatment. Pt has history of use since adolescence with minimal interruption. Pt evaluated for concern of odd behavior and possible paranoia re safety on unit. 33 yo SM w sed-hyp/etoh use disorders, denies psych history presents to rehab from detox unit. This is pt's first time in treatment. Pt has history of use since adolescence with minimal interruption. Pt evaluated for concern of odd behavior and possible paranoia re safety on unit. Patient with evidence of paranoia towards others on unit, with worry of others harming or robbing him. Pt has no intention of harming others, has no history of violence. Pt does not want to go to [facility] and there is no basis at present for involuntary commitment. Pt agrees to treatment plan of ssri and neuroleptic. Risks and benefits reviewed.

## 2018-04-26 ENCOUNTER — INPATIENT (INPATIENT)
Facility: HOSPITAL | Age: 33
LOS: 11 days | Discharge: HOME | End: 2018-05-08
Attending: PSYCHIATRY & NEUROLOGY | Admitting: PSYCHIATRY & NEUROLOGY

## 2018-04-26 VITALS — WEIGHT: 197.31 LBS | HEIGHT: 71 IN | TEMPERATURE: 98 F

## 2018-04-26 VITALS — DIASTOLIC BLOOD PRESSURE: 105 MMHG | HEART RATE: 91 BPM | SYSTOLIC BLOOD PRESSURE: 152 MMHG

## 2018-04-26 DIAGNOSIS — F32.9 MAJOR DEPRESSIVE DISORDER, SINGLE EPISODE, UNSPECIFIED: ICD-10-CM

## 2018-04-26 DIAGNOSIS — F10.250 ALCOHOL DEPENDENCE WITH ALCOHOL-INDUCED PSYCHOTIC DISORDER WITH DELUSIONS: ICD-10-CM

## 2018-04-26 DIAGNOSIS — R21 RASH AND OTHER NONSPECIFIC SKIN ERUPTION: ICD-10-CM

## 2018-04-26 DIAGNOSIS — F29 UNSPECIFIED PSYCHOSIS NOT DUE TO A SUBSTANCE OR KNOWN PHYSIOLOGICAL CONDITION: ICD-10-CM

## 2018-04-26 RX ORDER — ACETAMINOPHEN 500 MG
650 TABLET ORAL EVERY 6 HOURS
Qty: 0 | Refills: 0 | Status: DISCONTINUED | OUTPATIENT
Start: 2018-04-26 | End: 2018-05-08

## 2018-04-26 RX ORDER — HALOPERIDOL DECANOATE 100 MG/ML
5 INJECTION INTRAMUSCULAR EVERY 6 HOURS
Qty: 0 | Refills: 0 | Status: DISCONTINUED | OUTPATIENT
Start: 2018-04-26 | End: 2018-05-02

## 2018-04-26 RX ORDER — METOPROLOL TARTRATE 50 MG
100 TABLET ORAL DAILY
Qty: 0 | Refills: 0 | Status: DISCONTINUED | OUTPATIENT
Start: 2018-04-26 | End: 2018-05-08

## 2018-04-26 RX ORDER — PANTOPRAZOLE SODIUM 20 MG/1
40 TABLET, DELAYED RELEASE ORAL
Qty: 0 | Refills: 0 | Status: DISCONTINUED | OUTPATIENT
Start: 2018-04-26 | End: 2018-05-08

## 2018-04-26 RX ORDER — HYDROXYZINE HCL 10 MG
50 TABLET ORAL EVERY 6 HOURS
Qty: 0 | Refills: 0 | Status: DISCONTINUED | OUTPATIENT
Start: 2018-04-26 | End: 2018-05-07

## 2018-04-26 RX ORDER — RISPERIDONE 4 MG/1
0.5 TABLET ORAL
Qty: 0 | Refills: 0 | Status: DISCONTINUED | OUTPATIENT
Start: 2018-04-26 | End: 2018-04-27

## 2018-04-26 RX ORDER — HYDROXYZINE HCL 10 MG
100 TABLET ORAL AT BEDTIME
Qty: 0 | Refills: 0 | Status: DISCONTINUED | OUTPATIENT
Start: 2018-04-26 | End: 2018-05-08

## 2018-04-26 RX ORDER — GABAPENTIN 400 MG/1
300 CAPSULE ORAL THREE TIMES A DAY
Qty: 0 | Refills: 0 | Status: DISCONTINUED | OUTPATIENT
Start: 2018-04-26 | End: 2018-04-30

## 2018-04-26 RX ORDER — NYSTATIN CREAM 100000 [USP'U]/G
1 CREAM TOPICAL
Qty: 0 | Refills: 0 | Status: COMPLETED | OUTPATIENT
Start: 2018-04-26 | End: 2018-05-06

## 2018-04-26 RX ADMIN — GABAPENTIN 300 MILLIGRAM(S): 400 CAPSULE ORAL at 17:06

## 2018-04-26 RX ADMIN — Medication 2 MILLIGRAM(S): at 17:51

## 2018-04-26 RX ADMIN — Medication 100 MILLIGRAM(S): at 08:47

## 2018-04-26 RX ADMIN — Medication 2 MILLIGRAM(S): at 20:04

## 2018-04-26 RX ADMIN — PANTOPRAZOLE SODIUM 40 MILLIGRAM(S): 20 TABLET, DELAYED RELEASE ORAL at 08:47

## 2018-04-26 RX ADMIN — Medication 2 MILLIGRAM(S): at 23:34

## 2018-04-26 RX ADMIN — Medication 0.1 MILLIGRAM(S): at 16:25

## 2018-04-26 RX ADMIN — GABAPENTIN 300 MILLIGRAM(S): 400 CAPSULE ORAL at 08:47

## 2018-04-26 RX ADMIN — GABAPENTIN 300 MILLIGRAM(S): 400 CAPSULE ORAL at 20:09

## 2018-04-26 RX ADMIN — RISPERIDONE 0.5 MILLIGRAM(S): 4 TABLET ORAL at 20:06

## 2018-04-26 RX ADMIN — HALOPERIDOL DECANOATE 5 MILLIGRAM(S): 100 INJECTION INTRAMUSCULAR at 17:51

## 2018-04-26 RX ADMIN — Medication 0.1 MILLIGRAM(S): at 23:25

## 2018-04-26 RX ADMIN — Medication 20 MILLIGRAM(S): at 08:47

## 2018-04-26 NOTE — H&P ADULT - PMH
Anxiety    EtOH dependence    GERD (gastroesophageal reflux disease)    HTN (hypertension)    Obesity

## 2018-04-26 NOTE — PATIENT PROFILE BEHAVIORAL HEALTH - REASON FOR ADMISSION
Pt was in rehab for benzos and alcohol. Pt had a night terror last night. Pt states that he a vivid dream about being on vacation with his roommate. They went to a liquor store where they asked if he had any urges. In the liquor store they kept buying excessive alcohol. Then he saw a weird cartoonish figure woke up, and still saw the figure for a  few seconds.

## 2018-04-26 NOTE — H&P ADULT - HISTORY OF PRESENT ILLNESS
33 y/o male recently completed Detox for Alcohol and Benzo Dependency and was transferred to Rehab unit for further care about 4 days ago. While on the Rehab unit the patient became increasingly paranoid of the other patients. Evaluated by Psychiatry and felt to be exhibiting signs of Depression. He consented to voluntary admission to IPP for psychiatric management.  No Hx of admission to an IPP unit in the past.

## 2018-04-26 NOTE — PROGRESS NOTE ADULT - SUBJECTIVE AND OBJECTIVE BOX
Asked to see Pt due to acute psychosis. Pt presents A,Ox3, anxious looking, sweaty, tremulous. Pt is very anxious as he thinks that another Pt in the room nearby is a "rapper" who is "rapping, disclosing his home address". He hears this person through the vent. Pt has no insight into his delusions. /105. Of note, Pt has a h/o of benzo and etoh use and apparently no PPH. 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 I ordered Ativan 2mg PO q2h for objective sx of withdrawal and haldol 5mg q6 hours for agitation. Monitor closely for w/d sx.

## 2018-04-26 NOTE — CHART NOTE - NSCHARTNOTEFT_GEN_A_CORE
Pt seen and assessed for odd behavior this morning. Patient encountered in counselor's room reporting that he was unable to tolerate group because of preoccupation with a vivid dream he had last night. Pt describes experience consistent with "drug dreams" and hypnopompic experience but could not be reassured that these incidents did not have something to do with him being in danger on this unit. Pt with paranoia about peers planning to harm him, peers talking negatively about him, gives report of experience c/w "running commentary" about his actions. Today, pt agrees to voluntary transfer to [facility]. Pros and cons of this decision discussed. Pt likely will not benefit from drug rehab program while he is preoccupied with these thoughts, hypervigilent about his environment and feels he will be harmed. Pt with major depression, recurrent, severe, with psychotic features.   Started Risperdal 1 mg po hs- first dose on 4/25/18  Prozac 20 mg po qd, first dose on 4/26/18 (today)  Transfer to [facility], voluntary basis

## 2018-04-26 NOTE — H&P ADULT - NSHPPHYSICALEXAM_GEN_ALL_CORE
PHYSICAL EXAM:    Vital Signs Last 24 Hrs    T(F): 98.2 (04-26-18 @ 12:51), Max: 98.2 (04-26-18 @ 12:51)  HR: 91 (04-26-18 @ 11:49) (91 - 110)  BP: 152/105 (04-26-18 @ 11:49)  RR: -- 10-12/min    Constitutional: NAD, A&O x3    Eyes: PERRLA    Respiratory: +air entry, no rales, no rhonchi, no wheezes    Cardiovascular: +S1 and S2, regular rate and rhythm    Gastrointestinal: +BS, soft, non-tender, not distended    Extremities:  no edema, no calf tenderness    Vascular: +dorsal pedis and radial pulses, no extremity cyanosis    Neurological: sensation intact, ROM equal B/L, CN II-XII intact    Skin:  +Erythematous rash upper chest area, normal turgor

## 2018-04-26 NOTE — H&P ADULT - NSHPSOCIALHISTORY_GEN_ALL_CORE
Tobacco use: No  EtOH use: Yes X 3-4 yrs  Illicit drug use: Yes Xanax 4-5 yrs  Marital Status: Single

## 2018-04-27 DIAGNOSIS — I10 ESSENTIAL (PRIMARY) HYPERTENSION: ICD-10-CM

## 2018-04-27 DIAGNOSIS — F10.239 ALCOHOL DEPENDENCE WITH WITHDRAWAL, UNSPECIFIED: ICD-10-CM

## 2018-04-27 RX ORDER — LIDOCAINE 4 G/100G
5 CREAM TOPICAL THREE TIMES A DAY
Qty: 0 | Refills: 0 | Status: COMPLETED | OUTPATIENT
Start: 2018-04-27 | End: 2018-05-04

## 2018-04-27 RX ORDER — RISPERIDONE 4 MG/1
0.5 TABLET ORAL THREE TIMES A DAY
Qty: 0 | Refills: 0 | Status: DISCONTINUED | OUTPATIENT
Start: 2018-04-27 | End: 2018-05-02

## 2018-04-27 RX ADMIN — PANTOPRAZOLE SODIUM 40 MILLIGRAM(S): 20 TABLET, DELAYED RELEASE ORAL at 08:44

## 2018-04-27 RX ADMIN — GABAPENTIN 300 MILLIGRAM(S): 400 CAPSULE ORAL at 08:44

## 2018-04-27 RX ADMIN — NYSTATIN CREAM 1 APPLICATION(S): 100000 CREAM TOPICAL at 08:46

## 2018-04-27 RX ADMIN — LIDOCAINE 5 MILLILITER(S): 4 CREAM TOPICAL at 20:05

## 2018-04-27 RX ADMIN — Medication 1 MILLIGRAM(S): at 20:05

## 2018-04-27 RX ADMIN — Medication 100 MILLIGRAM(S): at 08:44

## 2018-04-27 RX ADMIN — Medication 100 MILLIGRAM(S): at 22:19

## 2018-04-27 RX ADMIN — GABAPENTIN 300 MILLIGRAM(S): 400 CAPSULE ORAL at 20:05

## 2018-04-27 RX ADMIN — RISPERIDONE 0.5 MILLIGRAM(S): 4 TABLET ORAL at 20:06

## 2018-04-27 RX ADMIN — RISPERIDONE 0.5 MILLIGRAM(S): 4 TABLET ORAL at 08:44

## 2018-04-27 RX ADMIN — NYSTATIN CREAM 1 APPLICATION(S): 100000 CREAM TOPICAL at 20:07

## 2018-04-27 RX ADMIN — Medication 2 MILLIGRAM(S): at 13:44

## 2018-04-27 RX ADMIN — GABAPENTIN 300 MILLIGRAM(S): 400 CAPSULE ORAL at 13:46

## 2018-04-27 RX ADMIN — RISPERIDONE 0.5 MILLIGRAM(S): 4 TABLET ORAL at 13:46

## 2018-04-27 NOTE — PROGRESS NOTE BEHAVIORAL HEALTH - NSBHFUPINTERVALHXFT_PSY_A_CORE
Pt seen, chart reviewed. Pt was anxious and paranoid.  Patient is alert, anxious, cooperative, compliant with treatment. Pt is acutely deluional and paranoid. Patient is in good behavioral control.      ***Pt denies and presents no evidence for suicidal or homicidal ideas plans or intentions.   Pt may experience symptoms af delayed  alcohol withdrawal or new emergent psychosis

## 2018-04-27 NOTE — CONSULT NOTE ADULT - SUBJECTIVE AND OBJECTIVE BOX
OMAR MONACO  32y  Male      Patient is a 32y old  Male who presents with a chief complaint of Pt was in rehab for benzos and alcohol. Pt had a night terror last night. Pt states that he a vivid dream about being on vacation with his roommate. They went to a liquor store where they asked if he had any urges. In the liquor store they kept buying excessive alcohol. Then he saw a weird cartoonish figure woke up, and still saw the figure for a  few seconds. (26 Apr 2018 13:04)    HPI:  31 y/o male recently completed Detox for Alcohol and Benzo Dependency and was transferred to Rehab unit for further care about 4 days ago. While on the Rehab unit the patient became increasingly paranoid of the other patients. Evaluated by Psychiatry and felt to be exhibiting signs of Depression. He consented to voluntary admission to IPP for psychiatric management.  No Hx of admission to an IPP unit in the past. (26 Apr 2018 12:46)    INTERVAL HPI/OVERNIGHT EVENTS:  HEALTH ISSUES - PROBLEM Dx:  Alcohol dependence with alcohol-induced psychotic disorder with delusions: Alcohol dependence with alcohol-induced psychotic disorder with delusions  Psychosis, unspecified psychosis type: Psychosis, unspecified psychosis type  Rash in adult: Rash in adult  Depression: Depression        PAST MEDICAL & SURGICAL HISTORY:  Anxiety  Obesity  GERD (gastroesophageal reflux disease)  HTN (hypertension)  EtOH dependence  No significant past surgical history    FAMILY HISTORY:  No pertinent family history in first degree relatives    acetaminophen   Tablet. 650 milliGRAM(s) Oral every 6 hours PRN  cloNIDine 0.1 milliGRAM(s) Oral every 12 hours PRN  gabapentin 300 milliGRAM(s) Oral three times a day  haloperidol     Tablet 5 milliGRAM(s) Oral every 6 hours PRN  hydrOXYzine hydrochloride 50 milliGRAM(s) Oral every 6 hours PRN  hydrOXYzine hydrochloride 100 milliGRAM(s) Oral at bedtime PRN  LORazepam     Tablet 2 milliGRAM(s) Oral every 2 hours PRN  metoprolol succinate  milliGRAM(s) Oral daily  nystatin Cream 1 Application(s) Topical two times a day  pantoprazole    Tablet 40 milliGRAM(s) Oral before breakfast  risperiDONE   Tablet 0.5 milliGRAM(s) Oral two times a day      REVIEW OF SYSTEMS:  CONSTITUTIONAL: No fever, weight loss, or fatigue  EYES: No eye pain, visual disturbances, or discharge  ENMT:  No difficulty hearing, tinnitus, vertigo; No sinus or throat pain  NECK: No pain or stiffness  BREASTS: No pain, masses, or nipple discharge  RESPIRATORY: No cough, wheezing, chills or hemoptysis; No shortness of breath  CARDIOVASCULAR: No chest pain, palpitations, dizziness, or leg swelling  GASTROINTESTINAL: No abdominal or epigastric pain. No nausea, vomiting, or hematemesis; No diarrhea or constipation. No melena or hematochezia.  GENITOURINARY: No dysuria, frequency, hematuria, or incontinence  NEUROLOGICAL: No headaches, memory loss, loss of strength, numbness, or tremors  SKIN: No itching, burning, rashes, or lesions   LYMPH NODES: No enlarged glands  ENDOCRINE: No heat or cold intolerance; No hair loss  MUSCULOSKELETAL: No joint pain or swelling; No muscle, back, or extremity pain  PSYCHIATRIC: as per hpi and previous psych history  HEME/LYMPH: No easy bruising, or bleeding gums  ALLERY AND IMMUNOLOGIC: No hives or eczema    T(C): 36.7 (04-27-18 @ 06:15), Max: 36.8 (04-26-18 @ 12:51)  HR: 90 (04-27-18 @ 06:15) (90 - 110)  BP: 134/78 (04-27-18 @ 06:15) (134/78 - 170/104)  RR: 18 (04-27-18 @ 06:15) (18 - 18)  SpO2: --  Wt(kg): --Vital Signs Last 24 Hrs  T(C): 36.7 (27 Apr 2018 06:15), Max: 36.8 (26 Apr 2018 12:51)  T(F): 98.1 (27 Apr 2018 06:15), Max: 98.2 (26 Apr 2018 12:51)  HR: 90 (27 Apr 2018 06:15) (90 - 110)  BP: 134/78 (27 Apr 2018 06:15) (134/78 - 170/104)  BP(mean): --  RR: 18 (27 Apr 2018 06:15) (18 - 18)  SpO2: --    PHYSICAL EXAM:  GENERAL: NAD, well-groomed, well-developed  HEAD:  Atraumatic, Normocephalic  EYES: EOMI, PERRLA, conjunctiva and sclera clear  ENMT: No tonsillar erythema, exudates, or enlargement; Moist mucous membranes, Good dentition, No lesions  NECK: Supple, No JVD, Normal thyroid  NERVOUS SYSTEM:  Alert & Oriented X3, Good concentration; Motor Strength 5/5 B/L upper and lower extremities; DTRs 2+ intact and symmetric  CHEST/LUNG: Clear to percussion bilaterally; No rales, rhonchi, wheezing, or rubs  HEART: Regular rate and rhythm; No murmurs, rubs, or gallops  ABDOMEN: Soft, Nontender, Nondistended; Bowel sounds present  EXTREMITIES:  2+ Peripheral Pulses, No clubbing, cyanosis, or edema  LYMPH: No lymphadenopathy noted  SKIN: No rashes or lesions  Neuro: alert  no focal deficits    Consultant(s) Notes Reviewed:  [x ] YES  [ ] NO  Care Discussed with Consultants/Other Providers [ x] YES  [ ] NO    LABS:              CAPILLARY BLOOD GLUCOSE                RADIOLOGY & ADDITIONAL TESTS:    Imaging Personally Reviewed:  [ ] YES  [ ] NO

## 2018-04-27 NOTE — CHART NOTE - NSCHARTNOTEFT_GEN_A_CORE
Social Work Note:    Patient is 32 years of age male who was admitted from [facility] inpatient chemical dependency unit for evaluation of psychosis.  Psychiatrist was consulted to see patient on rehab unit.  At time of assessment patient appeared anxious.  He verbalized concern for another patient who was a rapper and that this person was rapping his home address.  The voice of this other person was coming through the vent in his room.  No insight into his delusions.  History of benzo and alcohol use which was reason for admission to rehab unit.  ? substance induced psychosis with delayed onset.     Celso has history of alcohol use from age 12 though regular use started at age 20.  Cannabis use started at age 20.  Some time ago patient was prescribed xanax and he also used oxycodone.  Patient eventually had treatment at an outpatient suboxone program.  When asked about periods of sobriety patient informed that time never really started.  Currently patient endorses current alcohol use of two to three times a week, and he now down to two bars of xanax a day.      Prior inpatient psychiatric treatment denied.  He has history of psychiatric medication trials and about two attempts for outpatient mental health treatment.  Past suicide attempts denied.  Suicidal / homicidal ideation denied.      Patient is single.  Employment status is unemployed.  He does have history of employment with [employer] - last worked in February of 2018.  This position was held for six months.  Highest level of education is some college.       will continue to meet with patient 1:1 and with treatment team daily.  Discharge plan is for referral for continued treatment in outpatient setting.      Please refer to Social Work Psychosocial Assessment for additional information.

## 2018-04-27 NOTE — PROGRESS NOTE BEHAVIORAL HEALTH - NSBHFUPINTERVALHXFT_PSY_A_CORE
Pt seen, chart reviewed. Pt was anxious an dparanoid  overnight.  Patient is alert, anxious, cooperative, compliant with treatment. Pt is acutely deluional and paranoid. Patient is in good behavioral control.  Pt presents no acute management problems.     ***Pt denies and presents no evidence for suicidal or homicidal ideas plans or intentions.    ***Pt reports normal appetite and regular bowel movements. Pt is tolerating meds well w/o any acute S/E, and pt has no medication related complaints. Pt may expereience withdrawal delirioum . Will start standing ativan

## 2018-04-27 NOTE — PROGRESS NOTE ADULT - SUBJECTIVE AND OBJECTIVE BOX
pt stable alert in NAD  no new complaints    No h/o HF  No pertinent family history in first degree relatives  Anxiety  Obesity  GERD (gastroesophageal reflux disease)  HTN (hypertension)  EtOH dependence  HTN (hypertension)  Alcohol dependence with alcohol-induced psychotic disorder with delusions  Psychosis, unspecified psychosis type  Rash in adult  Depression  No significant past surgical history    HEALTH ISSUES - PROBLEM Dx:  HTN (hypertension): HTN (hypertension)  Alcohol dependence with alcohol-induced psychotic disorder with delusions: 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 650 milliGRAM(s) Oral every 6 hours PRN  cloNIDine 0.1 milliGRAM(s) Oral every 12 hours PRN  gabapentin 300 milliGRAM(s) Oral three times a day  haloperidol     Tablet 5 milliGRAM(s) Oral every 6 hours PRN  hydrOXYzine hydrochloride 50 milliGRAM(s) Oral every 6 hours PRN  hydrOXYzine hydrochloride 100 milliGRAM(s) Oral at bedtime PRN  LORazepam     Tablet 2 milliGRAM(s) Oral every 2 hours PRN  metoprolol succinate  milliGRAM(s) Oral daily  nystatin Cream 1 Application(s) Topical two times a day  pantoprazole    Tablet 40 milliGRAM(s) Oral before breakfast  risperiDONE   Tablet 0.5 milliGRAM(s) Oral two times a day      T(C): 36.7 (04-27-18 @ 06:15), Max: 36.8 (04-26-18 @ 12:51)  HR: 90 (04-27-18 @ 06:15) (90 - 110)  BP: 134/78 (04-27-18 @ 06:15) (134/78 - 170/104)  RR: 18 (04-27-18 @ 06:15) (18 - 18)  SpO2: --    PE;  general:  remains stable    Lungs:    Heart:    EXT:    Neuro:                          CAPILLARY BLOOD GLUCOSE

## 2018-04-28 RX ADMIN — NYSTATIN CREAM 1 APPLICATION(S): 100000 CREAM TOPICAL at 08:30

## 2018-04-28 RX ADMIN — Medication 1 MILLIGRAM(S): at 13:09

## 2018-04-28 RX ADMIN — LIDOCAINE 5 MILLILITER(S): 4 CREAM TOPICAL at 13:08

## 2018-04-28 RX ADMIN — GABAPENTIN 300 MILLIGRAM(S): 400 CAPSULE ORAL at 20:08

## 2018-04-28 RX ADMIN — GABAPENTIN 300 MILLIGRAM(S): 400 CAPSULE ORAL at 08:29

## 2018-04-28 RX ADMIN — Medication 1 MILLIGRAM(S): at 20:13

## 2018-04-28 RX ADMIN — PANTOPRAZOLE SODIUM 40 MILLIGRAM(S): 20 TABLET, DELAYED RELEASE ORAL at 08:30

## 2018-04-28 RX ADMIN — NYSTATIN CREAM 1 APPLICATION(S): 100000 CREAM TOPICAL at 20:07

## 2018-04-28 RX ADMIN — RISPERIDONE 0.5 MILLIGRAM(S): 4 TABLET ORAL at 20:08

## 2018-04-28 RX ADMIN — RISPERIDONE 0.5 MILLIGRAM(S): 4 TABLET ORAL at 08:31

## 2018-04-28 RX ADMIN — LIDOCAINE 5 MILLILITER(S): 4 CREAM TOPICAL at 20:08

## 2018-04-28 RX ADMIN — GABAPENTIN 300 MILLIGRAM(S): 400 CAPSULE ORAL at 13:08

## 2018-04-28 RX ADMIN — Medication 1 MILLIGRAM(S): at 08:30

## 2018-04-28 RX ADMIN — LIDOCAINE 5 MILLILITER(S): 4 CREAM TOPICAL at 08:30

## 2018-04-28 RX ADMIN — Medication 100 MILLIGRAM(S): at 08:30

## 2018-04-28 RX ADMIN — Medication 100 MILLIGRAM(S): at 20:08

## 2018-04-28 RX ADMIN — RISPERIDONE 0.5 MILLIGRAM(S): 4 TABLET ORAL at 13:09

## 2018-04-28 NOTE — PROGRESS NOTE BEHAVIORAL HEALTH - NSBHFUPINTERVALHXFT_PSY_A_CORE
Pt seen, chart reviewed. Ptslept well  overnight.  Patient is alert, calm, cooperative, compliant with treatment. Pt is not deluional or paranoid. Patient is in good behavioral control.  Pt presents no acute management problems.     ***Pt denies and presents no evidence for suicidal or homicidal ideas plans or intentions.    ***Pt reports normal appetite and regular bowel movements. Pt is tolerating meds well w/o any acute S/E, and pt has no medication related complaints. Pt was probably expereience withdrawal delirioum . Will continue slow ativan taper.

## 2018-04-29 RX ADMIN — GABAPENTIN 300 MILLIGRAM(S): 400 CAPSULE ORAL at 20:14

## 2018-04-29 RX ADMIN — LIDOCAINE 5 MILLILITER(S): 4 CREAM TOPICAL at 08:24

## 2018-04-29 RX ADMIN — LIDOCAINE 5 MILLILITER(S): 4 CREAM TOPICAL at 20:14

## 2018-04-29 RX ADMIN — LIDOCAINE 5 MILLILITER(S): 4 CREAM TOPICAL at 13:47

## 2018-04-29 RX ADMIN — GABAPENTIN 300 MILLIGRAM(S): 400 CAPSULE ORAL at 13:47

## 2018-04-29 RX ADMIN — Medication 1 MILLIGRAM(S): at 13:47

## 2018-04-29 RX ADMIN — Medication 100 MILLIGRAM(S): at 20:18

## 2018-04-29 RX ADMIN — PANTOPRAZOLE SODIUM 40 MILLIGRAM(S): 20 TABLET, DELAYED RELEASE ORAL at 07:18

## 2018-04-29 RX ADMIN — RISPERIDONE 0.5 MILLIGRAM(S): 4 TABLET ORAL at 08:25

## 2018-04-29 RX ADMIN — RISPERIDONE 0.5 MILLIGRAM(S): 4 TABLET ORAL at 20:12

## 2018-04-29 RX ADMIN — GABAPENTIN 300 MILLIGRAM(S): 400 CAPSULE ORAL at 08:24

## 2018-04-29 RX ADMIN — Medication 100 MILLIGRAM(S): at 08:24

## 2018-04-29 RX ADMIN — RISPERIDONE 0.5 MILLIGRAM(S): 4 TABLET ORAL at 13:48

## 2018-04-29 RX ADMIN — Medication 1 MILLIGRAM(S): at 08:24

## 2018-04-29 RX ADMIN — NYSTATIN CREAM 1 APPLICATION(S): 100000 CREAM TOPICAL at 20:14

## 2018-04-29 RX ADMIN — Medication 1 MILLIGRAM(S): at 20:15

## 2018-04-29 RX ADMIN — NYSTATIN CREAM 1 APPLICATION(S): 100000 CREAM TOPICAL at 08:25

## 2018-04-29 NOTE — PROGRESS NOTE BEHAVIORAL HEALTH - NSBHFUPINTERVALHXFT_PSY_A_CORE
Pt seen, chart reviewed. Pt slept well  overnight.  Patient is alert, calm, cooperative, compliant with treatment. Pt is not delusional or paranoid. Patient is in good behavioral control.  Pt presents no acute management problems.     ***Pt denies and presents no evidence for suicidal or homicidal ideas plans or intentions.    ***Pt reports normal appetite and regular bowel movements. Pt is tolerating meds well w/o any acute S/E, and pt has no medication related complaints. Pt was probably experienced withdrawal delirium , but slowly improving Will continue slow ativan taper.

## 2018-04-30 DIAGNOSIS — F13.20 SEDATIVE, HYPNOTIC OR ANXIOLYTIC DEPENDENCE, UNCOMPLICATED: ICD-10-CM

## 2018-04-30 DIAGNOSIS — Z56.0 UNEMPLOYMENT, UNSPECIFIED: ICD-10-CM

## 2018-04-30 DIAGNOSIS — F41.9 ANXIETY DISORDER, UNSPECIFIED: ICD-10-CM
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
## 2018-04-30 DIAGNOSIS — Z51.89 ENCOUNTER FOR OTHER SPECIFIED AFTERCARE: ICD-10-CM

## 2018-04-30 DIAGNOSIS — F10.20 ALCOHOL DEPENDENCE, UNCOMPLICATED: ICD-10-CM

## 2018-04-30 DIAGNOSIS — E66.9 OBESITY, UNSPECIFIED: ICD-10-CM

## 2018-04-30 DIAGNOSIS — I10 ESSENTIAL (PRIMARY) HYPERTENSION: ICD-10-CM

## 2018-04-30 DIAGNOSIS — F32.9 MAJOR DEPRESSIVE DISORDER, SINGLE EPISODE, UNSPECIFIED: ICD-10-CM

## 2018-04-30 DIAGNOSIS — K21.9 GASTRO-ESOPHAGEAL REFLUX DISEASE WITHOUT ESOPHAGITIS: ICD-10-CM
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
## 2018-04-30 RX ORDER — GABAPENTIN 400 MG/1
400 CAPSULE ORAL THREE TIMES A DAY
Qty: 0 | Refills: 0 | Status: DISCONTINUED | OUTPATIENT
Start: 2018-04-30 | End: 2018-05-07

## 2018-04-30 RX ADMIN — RISPERIDONE 0.5 MILLIGRAM(S): 4 TABLET ORAL at 20:40

## 2018-04-30 RX ADMIN — NYSTATIN CREAM 1 APPLICATION(S): 100000 CREAM TOPICAL at 20:42

## 2018-04-30 RX ADMIN — GABAPENTIN 400 MILLIGRAM(S): 400 CAPSULE ORAL at 15:06

## 2018-04-30 RX ADMIN — RISPERIDONE 0.5 MILLIGRAM(S): 4 TABLET ORAL at 13:20

## 2018-04-30 RX ADMIN — PANTOPRAZOLE SODIUM 40 MILLIGRAM(S): 20 TABLET, DELAYED RELEASE ORAL at 08:39

## 2018-04-30 RX ADMIN — Medication 0.5 MILLIGRAM(S): at 20:42

## 2018-04-30 RX ADMIN — Medication 0.5 MILLIGRAM(S): at 13:22

## 2018-04-30 RX ADMIN — Medication 100 MILLIGRAM(S): at 08:39

## 2018-04-30 RX ADMIN — Medication 1 MILLIGRAM(S): at 08:37

## 2018-04-30 RX ADMIN — GABAPENTIN 400 MILLIGRAM(S): 400 CAPSULE ORAL at 20:41

## 2018-04-30 RX ADMIN — Medication 50 MILLIGRAM(S): at 21:07

## 2018-04-30 RX ADMIN — LIDOCAINE 5 MILLILITER(S): 4 CREAM TOPICAL at 20:41

## 2018-04-30 RX ADMIN — NYSTATIN CREAM 1 APPLICATION(S): 100000 CREAM TOPICAL at 08:50

## 2018-04-30 RX ADMIN — LIDOCAINE 5 MILLILITER(S): 4 CREAM TOPICAL at 08:39

## 2018-04-30 RX ADMIN — LIDOCAINE 5 MILLILITER(S): 4 CREAM TOPICAL at 13:21

## 2018-04-30 RX ADMIN — GABAPENTIN 300 MILLIGRAM(S): 400 CAPSULE ORAL at 08:37

## 2018-04-30 RX ADMIN — RISPERIDONE 0.5 MILLIGRAM(S): 4 TABLET ORAL at 08:49

## 2018-04-30 SDOH — ECONOMIC STABILITY - INCOME SECURITY: UNEMPLOYMENT, UNSPECIFIED: Z56.0

## 2018-04-30 NOTE — PROGRESS NOTE BEHAVIORAL HEALTH - NSBHFUPINTERVALHXFT_PSY_A_CORE
Pt seen, chart reviewed. Pt slept well  overnight.  Patient is alert, calm, cooperative, compliant with treatment. Pt is not delusional or paranoid. Patient is in good behavioral control.  Pt presents no acute management problems.     ***Pt denies and presents no evidence for suicidal or homicidal ideas plans or intentions.    ***Pt reports normal appetite and regular bowel movements. Pt is tolerating meds well w/o any acute S/E, and pt has no medication related complaints. Pt was probably experienced withdrawal delirium , but slowly improving Will continue slow ativan taper. Neurontin was increased to 400 mg po tid. Pt is not interstted in rehab tx.

## 2018-04-30 NOTE — PROGRESS NOTE ADULT - SUBJECTIVE AND OBJECTIVE BOX
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 650 milliGRAM(s) Oral every 6 hours PRN  cloNIDine 0.1 milliGRAM(s) Oral every 12 hours PRN  gabapentin 300 milliGRAM(s) Oral three times a day  haloperidol     Tablet 5 milliGRAM(s) Oral every 6 hours PRN  hydrOXYzine hydrochloride 50 milliGRAM(s) Oral every 6 hours PRN  hydrOXYzine hydrochloride 100 milliGRAM(s) Oral at bedtime PRN  lidocaine 2% Viscous 5 milliLiter(s) Swish and Spit three times a day  LORazepam     Tablet 1 milliGRAM(s) Oral three times a day  LORazepam     Tablet 2 milliGRAM(s) Oral every 2 hours PRN  metoprolol succinate  milliGRAM(s) Oral daily  nystatin Cream 1 Application(s) Topical two times a day  pantoprazole    Tablet 40 milliGRAM(s) Oral before breakfast  risperiDONE   Tablet 0.5 milliGRAM(s) Oral three times a day      T(C): 35.2 (04-30-18 @ 06:12), Max: 37.5 (04-29-18 @ 17:34)  HR: 89 (04-30-18 @ 06:12) (82 - 108)  BP: 111/66 (04-30-18 @ 06:12) (111/66 - 149/111)  RR: 18 (04-30-18 @ 06:12) (18 - 20)  SpO2: --    PE;  general:  stable with no acute changes    Lungs:    Heart:    EXT:    Neuro: no deficits                          CAPILLARY BLOOD GLUCOSE

## 2018-05-01 RX ADMIN — RISPERIDONE 0.5 MILLIGRAM(S): 4 TABLET ORAL at 08:16

## 2018-05-01 RX ADMIN — Medication 100 MILLIGRAM(S): at 22:47

## 2018-05-01 RX ADMIN — RISPERIDONE 0.5 MILLIGRAM(S): 4 TABLET ORAL at 20:36

## 2018-05-01 RX ADMIN — Medication 0.5 MILLIGRAM(S): at 08:17

## 2018-05-01 RX ADMIN — LIDOCAINE 5 MILLILITER(S): 4 CREAM TOPICAL at 08:16

## 2018-05-01 RX ADMIN — RISPERIDONE 0.5 MILLIGRAM(S): 4 TABLET ORAL at 12:15

## 2018-05-01 RX ADMIN — Medication 0.5 MILLIGRAM(S): at 20:30

## 2018-05-01 RX ADMIN — GABAPENTIN 400 MILLIGRAM(S): 400 CAPSULE ORAL at 20:30

## 2018-05-01 RX ADMIN — PANTOPRAZOLE SODIUM 40 MILLIGRAM(S): 20 TABLET, DELAYED RELEASE ORAL at 07:24

## 2018-05-01 RX ADMIN — GABAPENTIN 400 MILLIGRAM(S): 400 CAPSULE ORAL at 08:15

## 2018-05-01 RX ADMIN — Medication 0.1 MILLIGRAM(S): at 17:37

## 2018-05-01 RX ADMIN — LIDOCAINE 5 MILLILITER(S): 4 CREAM TOPICAL at 12:14

## 2018-05-01 RX ADMIN — LIDOCAINE 5 MILLILITER(S): 4 CREAM TOPICAL at 20:31

## 2018-05-01 RX ADMIN — GABAPENTIN 400 MILLIGRAM(S): 400 CAPSULE ORAL at 12:14

## 2018-05-01 RX ADMIN — Medication 0.5 MILLIGRAM(S): at 12:14

## 2018-05-01 RX ADMIN — NYSTATIN CREAM 1 APPLICATION(S): 100000 CREAM TOPICAL at 20:32

## 2018-05-01 RX ADMIN — NYSTATIN CREAM 1 APPLICATION(S): 100000 CREAM TOPICAL at 08:16

## 2018-05-01 RX ADMIN — Medication 100 MILLIGRAM(S): at 08:15

## 2018-05-01 NOTE — PROGRESS NOTE BEHAVIORAL HEALTH - NSBHFUPINTERVALHXFT_PSY_A_CORE
Pt seen, chart reviewed. Pt slept well  overnight.  Patient is alert, calm, cooperative, compliant with treatment. Pt is not delusional or paranoid. Patient is in good behavioral control.  Pt presents no acute management problems.     ***Pt denies and presents no evidence for suicidal or homicidal ideas plans or intentions.    ***Pt reports normal appetite and regular bowel movements. Pt is tolerating meds well w/o any acute S/E, and pt has no medication related complaints. Pt was probably experienced withdrawal delirium , but slowly improving Will continue slow ativan taper. Neurontin was increased to 400 mg po tid. Pt is not interested in rehab tx. Will further dec ativan dose as of tomorrow. Labs ordered fro tomorrow.

## 2018-05-02 LAB
ALBUMIN SERPL ELPH-MCNC: 5.1 G/DL — SIGNIFICANT CHANGE UP (ref 3.5–5.2)
ALP SERPL-CCNC: 107 U/L — SIGNIFICANT CHANGE UP (ref 30–115)
ALT FLD-CCNC: 245 U/L — HIGH (ref 0–41)
ANION GAP SERPL CALC-SCNC: 15 MMOL/L — HIGH (ref 7–14)
AST SERPL-CCNC: 181 U/L — HIGH (ref 0–41)
BILIRUB SERPL-MCNC: 1.1 MG/DL — SIGNIFICANT CHANGE UP (ref 0.2–1.2)
BUN SERPL-MCNC: 11 MG/DL — SIGNIFICANT CHANGE UP (ref 10–20)
CALCIUM SERPL-MCNC: 10.3 MG/DL — HIGH (ref 8.5–10.1)
CHLORIDE SERPL-SCNC: 98 MMOL/L — SIGNIFICANT CHANGE UP (ref 98–110)
CO2 SERPL-SCNC: 25 MMOL/L — SIGNIFICANT CHANGE UP (ref 17–32)
CREAT SERPL-MCNC: 0.6 MG/DL — LOW (ref 0.7–1.5)
ESTIMATED AVERAGE GLUCOSE: 111 MG/DL — SIGNIFICANT CHANGE UP (ref 68–114)
GLUCOSE SERPL-MCNC: 90 MG/DL — SIGNIFICANT CHANGE UP (ref 70–99)
HBA1C BLD-MCNC: 5.5 % — SIGNIFICANT CHANGE UP (ref 4–5.6)
POTASSIUM SERPL-MCNC: 5.1 MMOL/L — HIGH (ref 3.5–5)
POTASSIUM SERPL-SCNC: 5.1 MMOL/L — HIGH (ref 3.5–5)
PROT SERPL-MCNC: 8.2 G/DL — HIGH (ref 6–8)
SODIUM SERPL-SCNC: 138 MMOL/L — SIGNIFICANT CHANGE UP (ref 135–146)

## 2018-05-02 RX ORDER — RISPERIDONE 4 MG/1
1 TABLET ORAL AT BEDTIME
Qty: 0 | Refills: 0 | Status: DISCONTINUED | OUTPATIENT
Start: 2018-05-02 | End: 2018-05-04

## 2018-05-02 RX ADMIN — GABAPENTIN 400 MILLIGRAM(S): 400 CAPSULE ORAL at 08:09

## 2018-05-02 RX ADMIN — GABAPENTIN 400 MILLIGRAM(S): 400 CAPSULE ORAL at 20:11

## 2018-05-02 RX ADMIN — PANTOPRAZOLE SODIUM 40 MILLIGRAM(S): 20 TABLET, DELAYED RELEASE ORAL at 08:11

## 2018-05-02 RX ADMIN — Medication 100 MILLIGRAM(S): at 08:09

## 2018-05-02 RX ADMIN — LIDOCAINE 5 MILLILITER(S): 4 CREAM TOPICAL at 08:10

## 2018-05-02 RX ADMIN — RISPERIDONE 0.5 MILLIGRAM(S): 4 TABLET ORAL at 08:11

## 2018-05-02 RX ADMIN — LIDOCAINE 5 MILLILITER(S): 4 CREAM TOPICAL at 12:05

## 2018-05-02 RX ADMIN — RISPERIDONE 1 MILLIGRAM(S): 4 TABLET ORAL at 20:11

## 2018-05-02 RX ADMIN — LIDOCAINE 5 MILLILITER(S): 4 CREAM TOPICAL at 20:12

## 2018-05-02 RX ADMIN — Medication 0.5 MILLIGRAM(S): at 08:09

## 2018-05-02 RX ADMIN — NYSTATIN CREAM 1 APPLICATION(S): 100000 CREAM TOPICAL at 08:11

## 2018-05-02 RX ADMIN — GABAPENTIN 400 MILLIGRAM(S): 400 CAPSULE ORAL at 12:05

## 2018-05-02 RX ADMIN — Medication 0.1 MILLIGRAM(S): at 17:50

## 2018-05-02 RX ADMIN — Medication 100 MILLIGRAM(S): at 22:14

## 2018-05-02 RX ADMIN — NYSTATIN CREAM 1 APPLICATION(S): 100000 CREAM TOPICAL at 20:12

## 2018-05-02 NOTE — PROGRESS NOTE BEHAVIORAL HEALTH - NSBHFUPINTERVALHXFT_PSY_A_CORE
Pt seen, chart reviewed. Pt slept well  overnight.  Patient is alert, calm, cooperative, compliant with treatment. Pt is not delusional or paranoid. Patient is in good behavioral control.  Pt presents no acute management problems.     ***Pt denies and presents no evidence for suicidal or homicidal ideas plans or intentions.    ***Pt reports normal appetite and regular bowel movements. Pt is tolerating meds well w/o any acute S/E, and pt has no medication related complaints. Pt was probably experienced withdrawal delirium , but slowly improving Will continue slow ativan taper. Neurontin was increased to 400 mg po tid. Pt is not interested in rehab tx. Will further decrease ativan and risperidone dose as of today. Labs reordered fro tomorrow. Pt's discharge is scheduled / projected  for  Friday?monday  with psychiatric f/u tx at  [facility] ([place]

## 2018-05-02 NOTE — PROGRESS NOTE BEHAVIORAL HEALTH - PRN MEDS
cloNIDine   0.1 milliGRAM(s) Oral (05-01-18 @ 17:37)    hydrOXYzine hydrochloride   100 milliGRAM(s) Oral (05-01-18 @ 22:47)

## 2018-05-03 LAB
ALBUMIN SERPL ELPH-MCNC: 4.7 G/DL — SIGNIFICANT CHANGE UP (ref 3.5–5.2)
ALP SERPL-CCNC: 98 U/L — SIGNIFICANT CHANGE UP (ref 30–115)
ALT FLD-CCNC: 207 U/L — HIGH (ref 0–41)
ANION GAP SERPL CALC-SCNC: 17 MMOL/L — HIGH (ref 7–14)
AST SERPL-CCNC: 151 U/L — HIGH (ref 0–41)
BASOPHILS # BLD AUTO: 0.12 K/UL — SIGNIFICANT CHANGE UP (ref 0–0.2)
BASOPHILS NFR BLD AUTO: 1.1 % — HIGH (ref 0–1)
BILIRUB SERPL-MCNC: 0.9 MG/DL — SIGNIFICANT CHANGE UP (ref 0.2–1.2)
BUN SERPL-MCNC: 15 MG/DL — SIGNIFICANT CHANGE UP (ref 10–20)
CALCIUM SERPL-MCNC: 10 MG/DL — SIGNIFICANT CHANGE UP (ref 8.5–10.1)
CHLORIDE SERPL-SCNC: 94 MMOL/L — LOW (ref 98–110)
CO2 SERPL-SCNC: 25 MMOL/L — SIGNIFICANT CHANGE UP (ref 17–32)
CREAT SERPL-MCNC: 0.6 MG/DL — LOW (ref 0.7–1.5)
EOSINOPHIL # BLD AUTO: 0.22 K/UL — SIGNIFICANT CHANGE UP (ref 0–0.7)
EOSINOPHIL NFR BLD AUTO: 1.9 % — SIGNIFICANT CHANGE UP (ref 0–8)
GLUCOSE SERPL-MCNC: 83 MG/DL — SIGNIFICANT CHANGE UP (ref 70–99)
HCT VFR BLD CALC: 40 % — LOW (ref 42–52)
HGB BLD-MCNC: 14 G/DL — SIGNIFICANT CHANGE UP (ref 14–18)
IMM GRANULOCYTES NFR BLD AUTO: 0.4 % — HIGH (ref 0.1–0.3)
LYMPHOCYTES # BLD AUTO: 18.3 % — LOW (ref 20.5–51.1)
LYMPHOCYTES # BLD AUTO: 2.08 K/UL — SIGNIFICANT CHANGE UP (ref 1.2–3.4)
MCHC RBC-ENTMCNC: 34.7 PG — HIGH (ref 27–31)
MCHC RBC-ENTMCNC: 35 G/DL — SIGNIFICANT CHANGE UP (ref 32–37)
MCV RBC AUTO: 99.3 FL — HIGH (ref 80–94)
MONOCYTES # BLD AUTO: 0.99 K/UL — HIGH (ref 0.1–0.6)
MONOCYTES NFR BLD AUTO: 8.7 % — SIGNIFICANT CHANGE UP (ref 1.7–9.3)
NEUTROPHILS # BLD AUTO: 7.9 K/UL — HIGH (ref 1.4–6.5)
NEUTROPHILS NFR BLD AUTO: 69.6 % — SIGNIFICANT CHANGE UP (ref 42.2–75.2)
NRBC # BLD: 0 /100 WBCS — SIGNIFICANT CHANGE UP (ref 0–0)
PLATELET # BLD AUTO: 552 K/UL — HIGH (ref 130–400)
POTASSIUM SERPL-MCNC: 5.1 MMOL/L — HIGH (ref 3.5–5)
POTASSIUM SERPL-SCNC: 5.1 MMOL/L — HIGH (ref 3.5–5)
PROT SERPL-MCNC: 7.8 G/DL — SIGNIFICANT CHANGE UP (ref 6–8)
RBC # BLD: 4.03 M/UL — LOW (ref 4.7–6.1)
RBC # FLD: 18.6 % — HIGH (ref 11.5–14.5)
SODIUM SERPL-SCNC: 136 MMOL/L — SIGNIFICANT CHANGE UP (ref 135–146)
WBC # BLD: 11.35 K/UL — HIGH (ref 4.8–10.8)
WBC # FLD AUTO: 11.35 K/UL — HIGH (ref 4.8–10.8)

## 2018-05-03 RX ORDER — AMLODIPINE BESYLATE 2.5 MG/1
5 TABLET ORAL ONCE
Qty: 0 | Refills: 0 | Status: COMPLETED | OUTPATIENT
Start: 2018-05-03 | End: 2018-05-04

## 2018-05-03 RX ORDER — AMLODIPINE BESYLATE 2.5 MG/1
5 TABLET ORAL DAILY
Qty: 0 | Refills: 0 | Status: DISCONTINUED | OUTPATIENT
Start: 2018-05-03 | End: 2018-05-04

## 2018-05-03 RX ORDER — NALTREXONE HYDROCHLORIDE 50 MG/1
50 TABLET, FILM COATED ORAL DAILY
Qty: 0 | Refills: 0 | Status: DISCONTINUED | OUTPATIENT
Start: 2018-05-03 | End: 2018-05-08

## 2018-05-03 RX ADMIN — NYSTATIN CREAM 1 APPLICATION(S): 100000 CREAM TOPICAL at 08:16

## 2018-05-03 RX ADMIN — PANTOPRAZOLE SODIUM 40 MILLIGRAM(S): 20 TABLET, DELAYED RELEASE ORAL at 08:16

## 2018-05-03 RX ADMIN — Medication 100 MILLIGRAM(S): at 22:45

## 2018-05-03 RX ADMIN — GABAPENTIN 400 MILLIGRAM(S): 400 CAPSULE ORAL at 08:15

## 2018-05-03 RX ADMIN — RISPERIDONE 1 MILLIGRAM(S): 4 TABLET ORAL at 20:04

## 2018-05-03 RX ADMIN — AMLODIPINE BESYLATE 5 MILLIGRAM(S): 2.5 TABLET ORAL at 11:19

## 2018-05-03 RX ADMIN — Medication 100 MILLIGRAM(S): at 08:15

## 2018-05-03 RX ADMIN — LIDOCAINE 5 MILLILITER(S): 4 CREAM TOPICAL at 15:12

## 2018-05-03 RX ADMIN — GABAPENTIN 400 MILLIGRAM(S): 400 CAPSULE ORAL at 20:03

## 2018-05-03 RX ADMIN — LIDOCAINE 5 MILLILITER(S): 4 CREAM TOPICAL at 08:15

## 2018-05-03 RX ADMIN — GABAPENTIN 400 MILLIGRAM(S): 400 CAPSULE ORAL at 15:12

## 2018-05-03 RX ADMIN — LIDOCAINE 5 MILLILITER(S): 4 CREAM TOPICAL at 20:04

## 2018-05-03 RX ADMIN — NALTREXONE HYDROCHLORIDE 50 MILLIGRAM(S): 50 TABLET, FILM COATED ORAL at 11:20

## 2018-05-03 NOTE — PROGRESS NOTE BEHAVIORAL HEALTH - PRN MEDS
cloNIDine   0.1 milliGRAM(s) Oral (05-01-18 @ 17:37)    hydrOXYzine hydrochloride   100 milliGRAM(s) Oral (05-01-18 @ 22:47) cloNIDine   0.1 milliGRAM(s) Oral (05-02-18 @ 17:50)    hydrOXYzine hydrochloride   100 milliGRAM(s) Oral (05-02-18 @ 22:14)

## 2018-05-03 NOTE — PROGRESS NOTE BEHAVIORAL HEALTH - NSBHFUPINTERVALHXFT_PSY_A_CORE
Pt seen, chart reviewed. Pt slept well  overnight.  Patient is alert, calm, cooperative, compliant with treatment. Pt is not delusional or paranoid. Patient is in good behavioral control.  Pt presents no acute management problems.     ***Pt denies and presents no evidence for suicidal or homicidal ideas plans or intentions.    ***Pt reports normal appetite and regular bowel movements. Pt is tolerating meds well w/o any acute S/E, and pt has no medication related complaints. Pt was probably experienced withdrawal delirium , but slowly improving Will continue slow ativan taper. Neurontin was increased to 400 mg po tid. Pt is not interested in rehab tx. Will further decrease ativan and risperidone dose as of today. Labs reordered fro tomorrow. Pt's discharge is scheduled / projected  for  Friday?monday  with psychiatric f/u tx at  [facility]. Pt seen, chart reviewed. Pt slept well  overnight.  Patient is alert, calm, cooperative, compliant with treatment. Pt is not delusional or paranoid. Patient is in good behavioral control.  Pt presents no acute management problems.     ***Pt denies and presents no evidence for suicidal or homicidal ideas plans or intentions.    ***Pt reports normal appetite and regular bowel movements. Pt is tolerating meds well w/o any acute S/E, and pt has no medication related complaints. Pt was probably experienced withdrawal delirium , but slowly improving. Neurontin was increased to 400 mg po tid. Naltrexone 50 mg po was started fro alcohol cravings. Pt is not interested in rehab tx. . Pt's discharge is scheduled / projected  for  Monday  with psychiatric f/u tx at  [facility].

## 2018-05-03 NOTE — CHART NOTE - NSCHARTNOTEFT_GEN_A_CORE
Called by Dr Mcmullen to evaluate HTN on TOPROL xl 100mg with Catapress being used PRN.  SBP range 130-140 periodically/ HR 80's.  Has been on a second BP med: Lisinopril but was stopped due to cough.    Plan: Add Norvasc 5mg Daily and follow BP trend, stop Catapress PRN.

## 2018-05-03 NOTE — PROGRESS NOTE ADULT - SUBJECTIVE AND OBJECTIVE BOX
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 650 milliGRAM(s) Oral every 6 hours PRN  cloNIDine 0.1 milliGRAM(s) Oral every 12 hours PRN  gabapentin 400 milliGRAM(s) Oral three times a day  hydrOXYzine hydrochloride 50 milliGRAM(s) Oral every 6 hours PRN  hydrOXYzine hydrochloride 100 milliGRAM(s) Oral at bedtime PRN  lidocaine 2% Viscous 5 milliLiter(s) Swish and Spit three times a day  LORazepam     Tablet 0.5 milliGRAM(s) Oral two times a day PRN  metoprolol succinate  milliGRAM(s) Oral daily  nystatin Cream 1 Application(s) Topical two times a day  pantoprazole    Tablet 40 milliGRAM(s) Oral before breakfast  risperiDONE   Tablet 1 milliGRAM(s) Oral at bedtime      T(C): 36.3 (05-03-18 @ 06:00), Max: 36.7 (05-02-18 @ 10:10)  HR: 85 (05-03-18 @ 06:00) (82 - 85)  BP: 113/64 (05-03-18 @ 06:00) (113/64 - 143/96)  RR: 17 (05-03-18 @ 06:00) (17 - 20)  SpO2: --    PE;  general: stable    Lungs:    Heart:    EXT:    Neuro:no deficits      14.0  40.0  11.35  --  --  --  --  --  --  --  11  0.6  5.1  90      05-02    138  |  98  |  11  ----------------------------<  90  5.1<H>   |  25  |  0.6<L>    Ca    10.3<H>      02 May 2018 05:00    TPro  8.2<H>  /  Alb  5.1  /  TBili  1.1  /  DBili  x   /  AST  181<H>  /  ALT  245<H>  /  AlkPhos  107  05-02      LIVER FUNCTIONS - ( 02 May 2018 05:00 )  Alb: 5.1 g/dL / Pro: 8.2 g/dL / ALK PHOS: 107 U/L / ALT: 245 U/L / AST: 181 U/L / GGT: x                                   14.0   11.35 )-----------( 552      ( 03 May 2018 06:21 )             40.0       CAPILLARY BLOOD GLUCOSE

## 2018-05-04 LAB
ANION GAP SERPL CALC-SCNC: 18 MMOL/L — HIGH (ref 7–14)
BUN SERPL-MCNC: 16 MG/DL — SIGNIFICANT CHANGE UP (ref 10–20)
CALCIUM SERPL-MCNC: 10.2 MG/DL — HIGH (ref 8.5–10.1)
CHLORIDE SERPL-SCNC: 97 MMOL/L — LOW (ref 98–110)
CO2 SERPL-SCNC: 24 MMOL/L — SIGNIFICANT CHANGE UP (ref 17–32)
CREAT SERPL-MCNC: 0.6 MG/DL — LOW (ref 0.7–1.5)
GLUCOSE SERPL-MCNC: 90 MG/DL — SIGNIFICANT CHANGE UP (ref 70–99)
HAV IGM SER-ACNC: SIGNIFICANT CHANGE UP
HBV CORE IGM SER-ACNC: SIGNIFICANT CHANGE UP
HBV SURFACE AG SER-ACNC: SIGNIFICANT CHANGE UP
HCV AB S/CO SERPL IA: 0.13 S/CO — SIGNIFICANT CHANGE UP
HCV AB SERPL-IMP: SIGNIFICANT CHANGE UP
POTASSIUM SERPL-MCNC: 5.3 MMOL/L — HIGH (ref 3.5–5)
POTASSIUM SERPL-SCNC: 5.3 MMOL/L — HIGH (ref 3.5–5)
SODIUM SERPL-SCNC: 139 MMOL/L — SIGNIFICANT CHANGE UP (ref 135–146)

## 2018-05-04 RX ORDER — RISPERIDONE 4 MG/1
0.5 TABLET ORAL AT BEDTIME
Qty: 0 | Refills: 0 | Status: DISCONTINUED | OUTPATIENT
Start: 2018-05-04 | End: 2018-05-08

## 2018-05-04 RX ADMIN — GABAPENTIN 400 MILLIGRAM(S): 400 CAPSULE ORAL at 12:00

## 2018-05-04 RX ADMIN — Medication 100 MILLIGRAM(S): at 22:31

## 2018-05-04 RX ADMIN — Medication 100 MILLIGRAM(S): at 08:37

## 2018-05-04 RX ADMIN — AMLODIPINE BESYLATE 5 MILLIGRAM(S): 2.5 TABLET ORAL at 09:52

## 2018-05-04 RX ADMIN — LIDOCAINE 5 MILLILITER(S): 4 CREAM TOPICAL at 08:40

## 2018-05-04 RX ADMIN — NALTREXONE HYDROCHLORIDE 50 MILLIGRAM(S): 50 TABLET, FILM COATED ORAL at 08:40

## 2018-05-04 RX ADMIN — GABAPENTIN 400 MILLIGRAM(S): 400 CAPSULE ORAL at 20:06

## 2018-05-04 RX ADMIN — AMLODIPINE BESYLATE 5 MILLIGRAM(S): 2.5 TABLET ORAL at 08:37

## 2018-05-04 RX ADMIN — GABAPENTIN 400 MILLIGRAM(S): 400 CAPSULE ORAL at 08:40

## 2018-05-04 RX ADMIN — RISPERIDONE 0.5 MILLIGRAM(S): 4 TABLET ORAL at 20:06

## 2018-05-04 RX ADMIN — Medication 650 MILLIGRAM(S): at 17:07

## 2018-05-04 RX ADMIN — PANTOPRAZOLE SODIUM 40 MILLIGRAM(S): 20 TABLET, DELAYED RELEASE ORAL at 08:42

## 2018-05-04 NOTE — PROGRESS NOTE BEHAVIORAL HEALTH - NSBHFUPINTERVALHXFT_PSY_A_CORE
Pt seen, chart reviewed. Pt slept well  overnight. Pt 's potassium is elevated the third day in a row. today is ts 5.3.  Patient is alert, calm, cooperative, compliant with treatment. Pt is not delusional or paranoid. Will reduce Risperidone to .5 mg po /d . Patient is in good behavioral control.  Pt presents no acute management problems.     ***Pt denies and presents no evidence for suicidal or homicidal ideas plans or intentions.    ***Pt reports normal appetite and regular bowel movements. Neurontin was increased to 400 mg po tid. Naltrexone 50 mg po was started fro alcohol cravings.. Pt's discharge is scheduled / projected  for  Monday  with psychiatric f/u tx at  [facility]. Medicine was alerted. to reassess pt's electrolytes.

## 2018-05-04 NOTE — PROGRESS NOTE ADULT - SUBJECTIVE AND OBJECTIVE BOX
labs - k-5.3  increase iv hydration  low k diet  rpt k   monitor pt labs - k-5.3  increase iv hydration  low k diet  dietary consult  rpt k   monitor pt

## 2018-05-05 LAB
ANION GAP SERPL CALC-SCNC: 18 MMOL/L — HIGH (ref 7–14)
BUN SERPL-MCNC: 16 MG/DL — SIGNIFICANT CHANGE UP (ref 10–20)
CALCIUM SERPL-MCNC: 10.3 MG/DL — HIGH (ref 8.5–10.1)
CHLORIDE SERPL-SCNC: 96 MMOL/L — LOW (ref 98–110)
CO2 SERPL-SCNC: 22 MMOL/L — SIGNIFICANT CHANGE UP (ref 17–32)
CREAT SERPL-MCNC: 0.6 MG/DL — LOW (ref 0.7–1.5)
FOLATE SERPL-MCNC: 3.6 NG/ML — LOW
GLUCOSE SERPL-MCNC: 145 MG/DL — HIGH (ref 70–99)
POTASSIUM SERPL-MCNC: 5.1 MMOL/L — HIGH (ref 3.5–5)
POTASSIUM SERPL-SCNC: 5.1 MMOL/L — HIGH (ref 3.5–5)
SODIUM SERPL-SCNC: 136 MMOL/L — SIGNIFICANT CHANGE UP (ref 135–146)
VIT B12 SERPL-MCNC: 747 PG/ML — SIGNIFICANT CHANGE UP (ref 232–1245)

## 2018-05-05 RX ADMIN — GABAPENTIN 400 MILLIGRAM(S): 400 CAPSULE ORAL at 20:20

## 2018-05-05 RX ADMIN — GABAPENTIN 400 MILLIGRAM(S): 400 CAPSULE ORAL at 08:13

## 2018-05-05 RX ADMIN — NYSTATIN CREAM 1 APPLICATION(S): 100000 CREAM TOPICAL at 20:21

## 2018-05-05 RX ADMIN — Medication 100 MILLIGRAM(S): at 08:13

## 2018-05-05 RX ADMIN — NALTREXONE HYDROCHLORIDE 50 MILLIGRAM(S): 50 TABLET, FILM COATED ORAL at 08:21

## 2018-05-05 RX ADMIN — RISPERIDONE 0.5 MILLIGRAM(S): 4 TABLET ORAL at 20:20

## 2018-05-05 RX ADMIN — GABAPENTIN 400 MILLIGRAM(S): 400 CAPSULE ORAL at 12:50

## 2018-05-05 RX ADMIN — Medication 100 MILLIGRAM(S): at 22:36

## 2018-05-05 RX ADMIN — PANTOPRAZOLE SODIUM 40 MILLIGRAM(S): 20 TABLET, DELAYED RELEASE ORAL at 08:13

## 2018-05-05 NOTE — PROGRESS NOTE BEHAVIORAL HEALTH - NSBHFUPINTERVALHXFT_PSY_A_CORE
Pt seen, chart reviewed. Pt slept well  overnight. Pt 's potassium is elevated the third day in a row. today is ts 5.1.  Patient is alert, calm, cooperative, compliant with treatment. P. Patient is in good behavioral control.  Pt presents no acute management problems.     ***Pt denies and presents no evidence for suicidal or homicidal ideas plans or intentions.   PHP. Medicine was alerted. to reassess pt's electrolytes. discussed with medical NP. f/u K level tomorrow. pt is on Low K diet. encourage pt to drink more fluid

## 2018-05-06 LAB
ANION GAP SERPL CALC-SCNC: 17 MMOL/L — HIGH (ref 7–14)
ANION GAP SERPL CALC-SCNC: 18 MMOL/L — HIGH (ref 7–14)
BUN SERPL-MCNC: 15 MG/DL — SIGNIFICANT CHANGE UP (ref 10–20)
BUN SERPL-MCNC: 16 MG/DL — SIGNIFICANT CHANGE UP (ref 10–20)
CALCIUM SERPL-MCNC: 10 MG/DL — SIGNIFICANT CHANGE UP (ref 8.5–10.1)
CALCIUM SERPL-MCNC: 10.2 MG/DL — HIGH (ref 8.5–10.1)
CHLORIDE SERPL-SCNC: 95 MMOL/L — LOW (ref 98–110)
CHLORIDE SERPL-SCNC: 96 MMOL/L — LOW (ref 98–110)
CO2 SERPL-SCNC: 23 MMOL/L — SIGNIFICANT CHANGE UP (ref 17–32)
CO2 SERPL-SCNC: 24 MMOL/L — SIGNIFICANT CHANGE UP (ref 17–32)
CREAT SERPL-MCNC: 0.6 MG/DL — LOW (ref 0.7–1.5)
CREAT SERPL-MCNC: 0.6 MG/DL — LOW (ref 0.7–1.5)
GLUCOSE SERPL-MCNC: 161 MG/DL — HIGH (ref 70–99)
GLUCOSE SERPL-MCNC: 99 MG/DL — SIGNIFICANT CHANGE UP (ref 70–99)
HCT VFR BLD CALC: 42.6 % — SIGNIFICANT CHANGE UP (ref 42–52)
HGB BLD-MCNC: 15 G/DL — SIGNIFICANT CHANGE UP (ref 14–18)
MCHC RBC-ENTMCNC: 34.6 PG — HIGH (ref 27–31)
MCHC RBC-ENTMCNC: 35.2 G/DL — SIGNIFICANT CHANGE UP (ref 32–37)
MCV RBC AUTO: 98.4 FL — HIGH (ref 80–94)
NRBC # BLD: 0 /100 WBCS — SIGNIFICANT CHANGE UP (ref 0–0)
PLATELET # BLD AUTO: 643 K/UL — HIGH (ref 130–400)
POTASSIUM SERPL-MCNC: 4.8 MMOL/L — SIGNIFICANT CHANGE UP (ref 3.5–5)
POTASSIUM SERPL-MCNC: 4.9 MMOL/L — SIGNIFICANT CHANGE UP (ref 3.5–5)
POTASSIUM SERPL-SCNC: 4.8 MMOL/L — SIGNIFICANT CHANGE UP (ref 3.5–5)
POTASSIUM SERPL-SCNC: 4.9 MMOL/L — SIGNIFICANT CHANGE UP (ref 3.5–5)
RBC # BLD: 4.33 M/UL — LOW (ref 4.7–6.1)
RBC # FLD: 18.4 % — HIGH (ref 11.5–14.5)
SODIUM SERPL-SCNC: 136 MMOL/L — SIGNIFICANT CHANGE UP (ref 135–146)
SODIUM SERPL-SCNC: 137 MMOL/L — SIGNIFICANT CHANGE UP (ref 135–146)
WBC # BLD: 13.79 K/UL — HIGH (ref 4.8–10.8)
WBC # FLD AUTO: 13.79 K/UL — HIGH (ref 4.8–10.8)

## 2018-05-06 RX ADMIN — Medication 100 MILLIGRAM(S): at 22:58

## 2018-05-06 RX ADMIN — GABAPENTIN 400 MILLIGRAM(S): 400 CAPSULE ORAL at 12:01

## 2018-05-06 RX ADMIN — RISPERIDONE 0.5 MILLIGRAM(S): 4 TABLET ORAL at 20:56

## 2018-05-06 RX ADMIN — GABAPENTIN 400 MILLIGRAM(S): 400 CAPSULE ORAL at 20:57

## 2018-05-06 RX ADMIN — GABAPENTIN 400 MILLIGRAM(S): 400 CAPSULE ORAL at 08:21

## 2018-05-06 RX ADMIN — Medication 100 MILLIGRAM(S): at 08:12

## 2018-05-06 RX ADMIN — NALTREXONE HYDROCHLORIDE 50 MILLIGRAM(S): 50 TABLET, FILM COATED ORAL at 08:11

## 2018-05-06 RX ADMIN — PANTOPRAZOLE SODIUM 40 MILLIGRAM(S): 20 TABLET, DELAYED RELEASE ORAL at 08:11

## 2018-05-06 NOTE — PROGRESS NOTE BEHAVIORAL HEALTH - NSBHFUPINTERVALHXFT_PSY_A_CORE
Pt reported feeling better and denies any suicidal or homicidal ideation compliant with treatment and responsive to staff's verbal redirection

## 2018-05-06 NOTE — DIETITIAN INITIAL EVALUATION ADULT. - OTHER INFO
pt recently completed detox for ETOH and benzo dependancy transferred to rehab 4 days ago pt became increasingly paranoid, consented to voluntary admission to [facility] for psychosis, On 5/4 labs revealed a potassium level of 5.3 and pt was placed on a low k+ diet, RD was consulted for diet education. today pt's potassium level in wnls at 4.8. Low potassium diet is not warranted recommended to change to a regular diet. At time of visit pt was aware of dietary restrictions and of now normal lab values. PMHX: anxiety, ETOH/benzo abuse, GERD, HTN

## 2018-05-07 RX ORDER — FOLIC ACID 0.8 MG
1 TABLET ORAL
Qty: 30 | Refills: 0 | OUTPATIENT
Start: 2018-05-07 | End: 2018-06-05

## 2018-05-07 RX ORDER — RISPERIDONE 4 MG/1
1 TABLET ORAL
Qty: 30 | Refills: 0 | OUTPATIENT
Start: 2018-05-07 | End: 2018-06-05

## 2018-05-07 RX ORDER — HYDROXYZINE HCL 10 MG
2 TABLET ORAL
Qty: 60 | Refills: 0 | OUTPATIENT
Start: 2018-05-07 | End: 2018-06-05

## 2018-05-07 RX ORDER — GABAPENTIN 400 MG/1
1 CAPSULE ORAL
Qty: 90 | Refills: 0 | OUTPATIENT
Start: 2018-05-07 | End: 2018-06-05

## 2018-05-07 RX ORDER — GABAPENTIN 400 MG/1
300 CAPSULE ORAL THREE TIMES A DAY
Qty: 0 | Refills: 0 | Status: DISCONTINUED | OUTPATIENT
Start: 2018-05-07 | End: 2018-05-08

## 2018-05-07 RX ORDER — NALTREXONE HYDROCHLORIDE 50 MG/1
1 TABLET, FILM COATED ORAL
Qty: 30 | Refills: 0 | OUTPATIENT
Start: 2018-05-07 | End: 2018-06-05

## 2018-05-07 RX ORDER — FOLIC ACID 0.8 MG
1 TABLET ORAL DAILY
Qty: 0 | Refills: 0 | Status: DISCONTINUED | OUTPATIENT
Start: 2018-05-07 | End: 2018-05-07

## 2018-05-07 RX ORDER — PANTOPRAZOLE SODIUM 20 MG/1
1 TABLET, DELAYED RELEASE ORAL
Qty: 30 | Refills: 0 | OUTPATIENT
Start: 2018-05-07 | End: 2018-06-05

## 2018-05-07 RX ORDER — FOLIC ACID 0.8 MG
1 TABLET ORAL DAILY
Qty: 0 | Refills: 0 | Status: DISCONTINUED | OUTPATIENT
Start: 2018-05-07 | End: 2018-05-08

## 2018-05-07 RX ORDER — METOPROLOL TARTRATE 50 MG
1 TABLET ORAL
Qty: 30 | Refills: 0 | OUTPATIENT
Start: 2018-05-07 | End: 2018-06-05

## 2018-05-07 RX ADMIN — GABAPENTIN 400 MILLIGRAM(S): 400 CAPSULE ORAL at 08:01

## 2018-05-07 RX ADMIN — Medication 100 MILLIGRAM(S): at 22:57

## 2018-05-07 RX ADMIN — RISPERIDONE 0.5 MILLIGRAM(S): 4 TABLET ORAL at 20:39

## 2018-05-07 RX ADMIN — GABAPENTIN 300 MILLIGRAM(S): 400 CAPSULE ORAL at 20:39

## 2018-05-07 RX ADMIN — NALTREXONE HYDROCHLORIDE 50 MILLIGRAM(S): 50 TABLET, FILM COATED ORAL at 08:01

## 2018-05-07 RX ADMIN — Medication 100 MILLIGRAM(S): at 08:01

## 2018-05-07 RX ADMIN — PANTOPRAZOLE SODIUM 40 MILLIGRAM(S): 20 TABLET, DELAYED RELEASE ORAL at 08:01

## 2018-05-07 RX ADMIN — GABAPENTIN 400 MILLIGRAM(S): 400 CAPSULE ORAL at 12:10

## 2018-05-07 NOTE — PROGRESS NOTE BEHAVIORAL HEALTH - NSBHFUPINTERVALHXFT_PSY_A_CORE
Pt seen, chart reviewed. Pt slept well  overnight. Pt 's potassium is normal, plateets are elavted.   Patient is alert, calm, cooperative, compliant with treatment. Pt is not delusional or paranoid. Continues  Risperidone to .5 mg po /d . Patient is in good behavioral control.  Pt presents no acute management problems.     ***Pt denies and presents no evidence for suicidal or homicidal ideas plans or intentions.    ***Pt reports normal appetite and regular bowel movements. Neurontin was increased to 400 mg po tid. Naltrexone 50 mg po was started fro alcohol cravings.. Pt's discharge is scheduled  for tomorrow  Tuesday  with psychiatric f/u tx at  [facility]. Medicine was alerted. to reassess pt's abn. labs.

## 2018-05-07 NOTE — CHART NOTE - NSCHARTNOTEFT_GEN_A_CORE
best number to reach pt for PHP- [phone], [address] [place] . pt slated for discharge tomorrow with PHP wednesday. pt report a good weekend, slept well, normal appetite and bowel movements. pt aox3, denies si hi avh. pt visible on unit adls within normal limits. pt eager for discharge. mother involved in treatment planning and notified of plan.

## 2018-05-07 NOTE — PROGRESS NOTE BEHAVIORAL HEALTH - ABNORMAL MOVEMENTS
No abnormal movements/Other
No abnormal movements/Other
Other
No abnormal movements/Other
No abnormal movements/Other
Other/No abnormal movements
Other
Other/No abnormal movements
Other
Other

## 2018-05-07 NOTE — PROGRESS NOTE BEHAVIORAL HEALTH - THOUGHT CONTENT
Delusions
Unremarkable
Unremarkable
Delusions
Delusions
Unremarkable

## 2018-05-07 NOTE — PROGRESS NOTE BEHAVIORAL HEALTH - AFFECT QUALITY
Euthymic/Anxious
Euthymic/Anxious
Anxious
Anxious
Anxious/Euthymic
Anxious/Euthymic
Euthymic/Anxious
Anxious
Euthymic/Anxious
Anxious

## 2018-05-07 NOTE — PROGRESS NOTE BEHAVIORAL HEALTH - SUMMARY
33 yo SM w sed-hyp/etoh use disorders, denies psych history presents to rehab from detox unit. This is pt's first time in treatment. Pt has history of use since adolescence with minimal interruption. Pt evaluated for concern of odd behavior and possible paranoia re safety on unit. Patient with evidence of paranoia towards others on unit, with worry of others harming or robbing him. Pt has no intention of harming others, has no history of violence. Pt does not want to go to [facility] and there is no basis at present for involuntary commitment. Pt agrees to treatment plan of neuroleptic. Risks and benefits reviewed.
33 yo SM w sed-hyp/etoh use disorders, denies psych history presents to rehab from detox unit. This is pt's first time in treatment. Pt has history of use since adolescence with minimal interruption. Pt evaluated for concern of odd behavior and possible paranoia re safety on unit. Patient with evidence of paranoia towards others on unit, with worry of others harming or robbing him. Pt has no intention of harming others, has no history of violence. Pt does not want to go to [facility] and there is no basis at present for involuntary commitment. Pt agrees to treatment plan of neuroleptic. Risks and benefits reviewed.
33 yo SM w sed-hyp/etoh use disorders, denies psych history presents to rehab from detox unit. This is pt's first time in treatment. Pt has history of use since adolescence with minimal interruption. Pt evaluated for concern of odd behavior and possible paranoia re safety on unit. Patient with evidence of paranoia towards others on unit, with worry of others harming or robbing him. Pt has no intention of harming others, has no history of violence. Pt does not want to go to [facility] and there is no basis at present for involuntary commitment. Pt agrees to treatment plan of neuroleptic. Risks and benefits reviewed.
33 yo SM w sed-hyp/etoh use disorders, denies psych history presents to rehab from detox unit. This is pt's first time in treatment. Pt has history of use since adolescence with minimal interruption. Pt evaluated for concern of odd behavior and possible paranoia re safety on unit. Patient with evidence of paranoia towards others on unit, with worry of others harming or robbing him. Pt has no intention of harming others, has no history of violence. Pt does not want to go to [facility] and there is no basis at present for involuntary commitment. Pt agrees to treatment plan of neuroleptic. Risks and benefits reviewed.
31 yo SM w sed-hyp/etoh use disorders, denies psych history presents to rehab from detox unit. This is pt's first time in treatment. Pt has history of use since adolescence with minimal interruption. Pt evaluated for concern of odd behavior and possible paranoia re safety on unit. Patient with evidence of paranoia towards others on unit, with worry of others harming or robbing him. Pt has no intention of harming others, has no history of violence. Pt does not want to go to [facility] and there is no basis at present for involuntary commitment. Pt agrees to treatment plan of neuroleptic. Risks and benefits reviewed.
31 yo SM w sed-hyp/etoh use disorders, denies psych history presents to rehab from detox unit. This is pt's first time in treatment. Pt has history of use since adolescence with minimal interruption. Pt evaluated for concern of odd behavior and possible paranoia re safety on unit. Patient with evidence of paranoia towards others on unit, with worry of others harming or robbing him. Pt has no intention of harming others, has no history of violence. Pt does not want to go to [facility] and there is no basis at present for involuntary commitment. Pt agrees to treatment plan of neuroleptic. Risks and benefits reviewed.
33 yo SM w sed-hyp/etoh use disorders, denies psych history presents to rehab from detox unit. This is pt's first time in treatment. Pt has history of use since adolescence with minimal interruption. Pt evaluated for concern of odd behavior and possible paranoia re safety on unit. Patient with evidence of paranoia towards others on unit, with worry of others harming or robbing him. Pt has no intention of harming others, has no history of violence. Pt does not want to go to [facility] and there is no basis at present for involuntary commitment. Pt agrees to treatment plan of neuroleptic. Risks and benefits reviewed.
31 yo SM w sed-hyp/etoh use disorders, denies psych history presents to rehab from detox unit. This is pt's first time in treatment. Pt has history of use since adolescence with minimal interruption. Pt evaluated for concern of odd behavior and possible paranoia re safety on unit. Patient with evidence of paranoia towards others on unit, with worry of others harming or robbing him. Pt has no intention of harming others, has no history of violence. Pt does not want to go to [facility] and there is no basis at present for involuntary commitment. Pt agrees to treatment plan of neuroleptic. Risks and benefits reviewed.
33 yo SM w sed-hyp/etoh use disorders, denies psych history presents to rehab from detox unit. This is pt's first time in treatment. Pt has history of use since adolescence with minimal interruption. Pt evaluated for concern of odd behavior and possible paranoia re safety on unit. Patient with evidence of paranoia towards others on unit, with worry of others harming or robbing him. Pt has no intention of harming others, has no history of violence. Pt does not want to go to [facility] and there is no basis at present for involuntary commitment. Pt agrees to treatment plan of neuroleptic. Risks and benefits reviewed.
33 yo SM w sed-hyp/etoh use disorders, denies psych history presents to rehab from detox unit. This is pt's first time in treatment. Pt has history of use since adolescence with minimal interruption. Pt evaluated for concern of odd behavior and possible paranoia re safety on unit. Patient with evidence of paranoia towards others on unit, with worry of others harming or robbing him. Pt has no intention of harming others, has no history of violence. Pt does not want to go to [facility] and there is no basis at present for involuntary commitment. Pt agrees to treatment plan of neuroleptic. Risks and benefits reviewed.
33 yo SM w sed-hyp/etoh use disorders, denies psych history presents to rehab from detox unit. This is pt's first time in treatment. Pt has history of use since adolescence with minimal interruption. Pt evaluated for concern of odd behavior and possible paranoia re safety on unit. Patient with evidence of paranoia towards others on unit, with worry of others harming or robbing him. Pt has no intention of harming others, has no history of violence. Pt does not want to go to [facility] and there is no basis at present for involuntary commitment. Pt agrees to treatment plan of neuroleptic. Risks and benefits reviewed.

## 2018-05-07 NOTE — PROGRESS NOTE BEHAVIORAL HEALTH - NS ED BHA MED ROS MUSCULOSKELETAL
No complaints
Yes
No complaints
Yes
No complaints
Yes
Yes

## 2018-05-07 NOTE — PROGRESS NOTE BEHAVIORAL HEALTH - NSBHFUPINTERVALCCFT_PSY_A_CORE
"Feeling better
"Feeling ok"
Late entry: Pt admitted from rehab 2/2 acute psychosis
Pt states that "He is feeling better."
The rapper is envious of my room, was rapping about me all night- it came throufh the vents"
"Feeling ok"
Feeling better
"Feeling ok"
"Feeling better

## 2018-05-07 NOTE — DISCHARGE NOTE BEHAVIORAL HEALTH - HPI (INCLUDE ILLNESS QUALITY, SEVERITY, DURATION, TIMING, CONTEXT, MODIFYING FACTORS, ASSOCIATED SIGNS AND SYMPTOMS)
31 yo single  male with ho etoh/sed hyp use admitted to rehab. There the pt was noted to be appearing odd in behavior, noted to be telling roommate that he felt that he would be robbed on unit by a particular peer and reports being ready to fight peer if needed. During this assessment, pt noted to be anxious and have great difficulty relaying personal history. Pt denies past IPP, past psych med trials, reports 1-2 brief attempts at outpt treatment but never consistent, denies past suicide attempts, denies SI. Pt reports poor sleep attributed to worry on this unit, pt states 'I don't know" how appetite is "they just serve breakfast lunch and dinner", reports social life as "I never really thought about it, but I don't have that many friends", pt became very tearful discussing death of friends, poor concentration, denies PDW/SI, poor energy, anhedonia. Pt told story of how he feels others on unit may be trying to harm him. States that today he felt that others were commenting on his actions while he was putting together his burger "look at him putting the ketchup on it". Reports he remains ready to defend himself but has no intention of proactively harming others. Does not feel that his roommate wants to harm him and actually feels safe with him and feels that he will protect him. Admits that before writer came to knock on door that he had been listening behind the closed door of his room as he was expecting to have someone come in and harm him. Pt states that he feels others may be trying to jack him on the unit but when asked, denies that he has anything of value on him or in his room. Story seems delusional. Pt at times noted to be whispering at times to make sure others don't hear us discussing his issues and would stop periodically because he thought he heard someone outside.

## 2018-05-07 NOTE — DISCHARGE NOTE BEHAVIORAL HEALTH - SECONDARY DIAGNOSIS.
Psychosis, unspecified psychosis type EtOH dependence Rash in adult HTN (hypertension) GERD (gastroesophageal reflux disease) Eczema

## 2018-05-07 NOTE — PROGRESS NOTE BEHAVIORAL HEALTH - PRIMARY DX
Psychosis, unspecified psychosis type

## 2018-05-07 NOTE — PROGRESS NOTE BEHAVIORAL HEALTH - THOUGHT PROCESS
Impaired reasoning/Linear
Linear/Impaired reasoning
Illogical/Impaired reasoning
Impaired reasoning/Illogical
Impaired reasoning/Linear
Linear/Impaired reasoning
Linear/Impaired reasoning
Impaired reasoning/Linear
Linear/Impaired reasoning
Linear/Impaired reasoning
Impaired reasoning/Linear
Impaired reasoning/Linear

## 2018-05-07 NOTE — PROGRESS NOTE BEHAVIORAL HEALTH - PROBLEM SELECTOR PROBLEM 3
Withdrawal symptoms, alcohol

## 2018-05-07 NOTE — PROGRESS NOTE ADULT - SUBJECTIVE AND OBJECTIVE BOX
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 650 milliGRAM(s) Oral every 6 hours PRN  gabapentin 400 milliGRAM(s) Oral three times a day  hydrOXYzine hydrochloride 50 milliGRAM(s) Oral every 6 hours PRN  hydrOXYzine hydrochloride 100 milliGRAM(s) Oral at bedtime PRN  metoprolol succinate  milliGRAM(s) Oral daily  naltrexone 50 milliGRAM(s) Oral daily  pantoprazole    Tablet 40 milliGRAM(s) Oral before breakfast  risperiDONE   Tablet 0.5 milliGRAM(s) Oral at bedtime      T(C): 36 (05-07-18 @ 06:16), Max: 36.2 (05-06-18 @ 11:47)  HR: 72 (05-07-18 @ 06:16) (72 - 76)  BP: 131/78 (05-07-18 @ 06:16) (131/78 - 133/94)  RR: 18 (05-07-18 @ 06:16) (16 - 18)  SpO2: --    PE;  general: stable no acute chaneges    Lungs:    Heart:    EXT:    Neuro:      15.0  42.6  13.79  16  0.6  4.8  161  --  --  --  15  0.6  4.9  99  --  --  --  16  0.6  5.1  145      05-06    136  |  95<L>  |  16  ----------------------------<  161<H>  4.8   |  23  |  0.6<L>    Ca    10.0      06 May 2018 11:00                                  15.0   13.79 )-----------( 643      ( 06 May 2018 11:00 )             42.6       CAPILLARY BLOOD GLUCOSE

## 2018-05-07 NOTE — DISCHARGE NOTE BEHAVIORAL HEALTH - NSBHDCSUBSTHXFT_PSY_A_CORE
33 yo single  male with ho etoh/sed hyp use . Pt reports etoh use started age 12, but regular use didn't start at age 20 but states "it was a weekend thing" and mj use started at that time. Pt reports substance use was "never daily" until mid20s when he moved to [facility]. Pt reports at that point he was living on his own for the first time and "kept liquor in freezer". Pt reports it was 2008 and he was working as an  and the downturn in economy happened and moved to [facility] "for a change". Use escalated at that point and approx 4 yrs ago ISO new relationship. Pt states the relationship was "very stressful and she was prescribed xanax. When she got her script she would just give me half right away". Pt began opiate use started approx 15 yrs ago with oxycodone. Pt first sought treatment with an outpt suboxone treatment 10 years ago. Pt reports following suboxone use, "I never had the urge anymore" and reports not resuming opiate use. This is pt's first inpt tx. When asked about stretches of sobriety pt states "it never really stopped. It was always on and off". When asked to quantify his etoh use, pt states he drank "2-3 times a week" and was "down to 2 bars of xanax a day".

## 2018-05-07 NOTE — PROGRESS NOTE BEHAVIORAL HEALTH - SECONDARY DX2
HTN (hypertension)

## 2018-05-07 NOTE — PROGRESS NOTE BEHAVIORAL HEALTH - NSBHADMITDANGERSELF_PSY_A_CORE
unable to care for self

## 2018-05-07 NOTE — PROGRESS NOTE BEHAVIORAL HEALTH - ESTIMATED DISCHARGE DATE
10-May-2018

## 2018-05-07 NOTE — DISCHARGE NOTE BEHAVIORAL HEALTH - MEDICATION SUMMARY - MEDICATIONS TO CHANGE
I will SWITCH the dose or number of times a day I take the medications listed below when I get home from the hospital:    metoprolol succinate 50 mg oral tablet, extended release  -- 1 tab(s) by mouth once a day    pantoprazole 40 mg oral delayed release tablet  -- 1 cap(s) orally    gabapentin 300 mg oral capsule  -- 1 cap(s) by mouth 3 times a day

## 2018-05-07 NOTE — DISCHARGE NOTE BEHAVIORAL HEALTH - MEDICATION SUMMARY - MEDICATIONS TO TAKE
I will START or STAY ON the medications listed below when I get home from the hospital:    gabapentin 300 mg oral capsule  -- 1 cap(s) by mouth 3 times a day  -- Indication: For Anxiety    risperiDONE 0.5 mg oral tablet  -- 1 tab(s) by mouth once a day (at bedtime)  -- Indication: For Psychosis, unspecified psychosis type    hydrOXYzine hydrochloride 50 mg oral tablet  -- 2 tab(s) by mouth once a day (at bedtime), As needed, insomnioa  -- Indication: For insomnia    metoprolol succinate 100 mg oral tablet, extended release  -- 1 tab(s) by mouth once a day  -- Indication: For HTN (hypertension)    naltrexone 50 mg oral tablet  -- 1 tab(s) by mouth once a day  -- Indication: For Alcohol craving    pantoprazole 40 mg oral delayed release tablet  -- 1 cap(s) by mouth once a day   -- Indication: For GERD (gastroesophageal reflux disease)    folic acid 1 mg oral tablet  -- 1 tab(s) by mouth once a day  -- Indication: For supplement

## 2018-05-07 NOTE — PROGRESS NOTE BEHAVIORAL HEALTH - NSBHADMITMEDEDUDETAILS_A_CORE FT
prolixin medication indications, risks, and benefits were discussed
prolixin medication indications, risks, and benefits were discussed
prolixinmedication indications, risks, and benefits were discussed
prolixin medication indications, risks, and benefits were discussed
prolixinmedication indications, risks, and benefits were discussed
prolixin medication indications, risks, and benefits were discussed
prolixinmedication indications, risks, and benefits were discussed
prolixin medication indications, risks, and benefits were discussed

## 2018-05-07 NOTE — DISCHARGE NOTE BEHAVIORAL HEALTH - NSBHDCTESTSFT_PSY_A_CORE
Pt had abn low potassium on a few occasions.  Pt had elevated platelets number  Pt has elevated LFTs

## 2018-05-07 NOTE — PROGRESS NOTE BEHAVIORAL HEALTH - NSBHPTASSESSDT_PSY_A_CORE
01-May-2018 14:01
02-May-2018 14:36
03-May-2018 14:22
04-May-2018 14:23
06-May-2018 18:03
26-Apr-2018 15:21
27-Apr-2018 12:59
30-Apr-2018 13:48
04-May-2018 14:23
28-Apr-2018 10:38
07-May-2018 16:24
28-Apr-2018 10:38

## 2018-05-07 NOTE — DISCHARGE NOTE BEHAVIORAL HEALTH - CARE PROVIDER_API CALL
Mirna Rea), Psych  Physicians  [address] [place] [address]  Phone: [phone]  Fax: [phone]

## 2018-05-07 NOTE — PROGRESS NOTE BEHAVIORAL HEALTH - NSBHCHARTREVIEWVS_PSY_A_CORE FT
Vital Signs Last 24 Hrs  T(C): 36.6 (29 Apr 2018 06:00), Max: 36.8 (28 Apr 2018 18:18)  T(F): 97.8 (29 Apr 2018 06:00), Max: 98.3 (28 Apr 2018 18:18)  HR: 92 (29 Apr 2018 06:00) (92 - 100)  BP: 110/70 (29 Apr 2018 06:00) (110/70 - 147/86)  BP(mean): --  RR: 18 (29 Apr 2018 06:00) (17 - 18)  SpO2: --
T(C): 36.1 (04-30-18 @ 10:30), Max: 37.5 (04-29-18 @ 17:34)  HR: 90 (04-30-18 @ 10:30) (89 - 108)  BP: 151/106 (04-30-18 @ 10:30) (111/66 - 151/106)  RR: 18 (04-30-18 @ 10:30) (18 - 20)  SpO2: --
T(C): 35.6 (05-04-18 @ 10:51), Max: 35.9 (05-03-18 @ 18:10)  HR: 84 (05-04-18 @ 10:51) (83 - 84)  BP: 160/91 (05-04-18 @ 10:51) (107/71 - 160/91)  RR: 16 (05-04-18 @ 10:51) (16 - 20)  SpO2: --
T(C): 36.3 (05-03-18 @ 12:00), Max: 36.6 (05-02-18 @ 18:11)  HR: 92 (05-03-18 @ 12:00) (85 - 92)  BP: 144/105 (05-03-18 @ 12:00) (113/64 - 144/105)  RR: 18 (05-03-18 @ 12:00) (17 - 20)  SpO2: --
T(C): 36.7 (05-02-18 @ 10:10), Max: 37 (05-01-18 @ 20:00)  HR: 82 (05-02-18 @ 10:10) (81 - 84)  BP: 143/96 (05-02-18 @ 10:10) (117/78 - 146/100)  RR: 18 (05-02-18 @ 10:10) (18 - 20)  SpO2: --
T(C): 35.9 (04-28-18 @ 10:08), Max: 36.8 (04-28-18 @ 06:00)  HR: 95 (04-28-18 @ 10:08) (82 - 95)  BP: 140/95 (04-28-18 @ 10:08) (128/65 - 143/85)  RR: 17 (04-28-18 @ 10:08) (17 - 18)  SpO2: --
T(C): 36.6 (04-27-18 @ 11:30), Max: 36.7 (04-26-18 @ 17:59)  HR: 89 (04-27-18 @ 11:30) (89 - 90)  BP: 143/85 (04-27-18 @ 11:30) (134/78 - 159/94)  RR: 18 (04-27-18 @ 11:30) (18 - 18)  SpO2: --
T(C): 36.8 (05-01-18 @ 11:48), Max: 36.8 (05-01-18 @ 11:48)  HR: 82 (05-01-18 @ 11:48) (82 - 91)  BP: 136/93 (05-01-18 @ 11:48) (133/86 - 151/96)  RR: 18 (05-01-18 @ 11:48) (16 - 18)  SpO2: --
Vital Signs Last 24 Hrs  T(C): 36.1 (05 May 2018 09:55), Max: 36.1 (05 May 2018 09:55)  T(F): 97 (05 May 2018 09:55), Max: 97 (05 May 2018 09:55)  HR: 68 (05 May 2018 09:55) (68 - 88)  BP: 141/95 (05 May 2018 09:55) (117/76 - 142/94)  BP(mean): --  RR: 18 (05 May 2018 09:55) (18 - 20)  SpO2: --
T(C): 36.3 (05-07-18 @ 10:06), Max: 36.3 (05-07-18 @ 10:06)  HR: 97 (05-07-18 @ 10:06) (72 - 97)  BP: 127/83 (05-07-18 @ 10:06) (127/83 - 133/94)  RR: 16 (05-07-18 @ 10:06) (16 - 18)  SpO2: --

## 2018-05-07 NOTE — PROGRESS NOTE BEHAVIORAL HEALTH - SECONDARY DX1
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

## 2018-05-07 NOTE — PROGRESS NOTE BEHAVIORAL HEALTH - BODY HABITUS
Problem: Knowledge Deficit  Goal: Patient/family/caregiver demonstrates understanding of disease process, treatment plan, medications, and discharge instructions  Complete learning assessment and assess knowledge base    Interventions:  - Provide teaching at level of understanding  - Provide teaching via preferred learning methods   Outcome: Progressing
Well nourished/Obese
Obese/Well nourished
Obese
Obese/Well nourished
Obese/Well nourished
Well nourished/Obese
Obese
Obese/Well nourished

## 2018-05-08 VITALS
SYSTOLIC BLOOD PRESSURE: 139 MMHG | DIASTOLIC BLOOD PRESSURE: 96 MMHG | RESPIRATION RATE: 16 BRPM | HEART RATE: 72 BPM | TEMPERATURE: 96 F

## 2018-05-08 DIAGNOSIS — D47.3 ESSENTIAL (HEMORRHAGIC) THROMBOCYTHEMIA: ICD-10-CM

## 2018-05-08 DIAGNOSIS — K76.89 OTHER SPECIFIED DISEASES OF LIVER: ICD-10-CM

## 2018-05-08 LAB
ALBUMIN SERPL ELPH-MCNC: 4.8 G/DL — SIGNIFICANT CHANGE UP (ref 3.5–5.2)
ALP SERPL-CCNC: 96 U/L — SIGNIFICANT CHANGE UP (ref 30–115)
ALT FLD-CCNC: 140 U/L — HIGH (ref 0–41)
ANION GAP SERPL CALC-SCNC: 14 MMOL/L — SIGNIFICANT CHANGE UP (ref 7–14)
AST SERPL-CCNC: 70 U/L — HIGH (ref 0–41)
BASOPHILS # BLD AUTO: 0.14 K/UL — SIGNIFICANT CHANGE UP (ref 0–0.2)
BASOPHILS NFR BLD AUTO: 1.3 % — HIGH (ref 0–1)
BILIRUB SERPL-MCNC: 1 MG/DL — SIGNIFICANT CHANGE UP (ref 0.2–1.2)
BUN SERPL-MCNC: 17 MG/DL — SIGNIFICANT CHANGE UP (ref 10–20)
CALCIUM SERPL-MCNC: 9.8 MG/DL — SIGNIFICANT CHANGE UP (ref 8.5–10.1)
CHLORIDE SERPL-SCNC: 97 MMOL/L — LOW (ref 98–110)
CO2 SERPL-SCNC: 26 MMOL/L — SIGNIFICANT CHANGE UP (ref 17–32)
CREAT SERPL-MCNC: 0.7 MG/DL — SIGNIFICANT CHANGE UP (ref 0.7–1.5)
EOSINOPHIL # BLD AUTO: 0.15 K/UL — SIGNIFICANT CHANGE UP (ref 0–0.7)
EOSINOPHIL NFR BLD AUTO: 1.4 % — SIGNIFICANT CHANGE UP (ref 0–8)
ERYTHROCYTE [SEDIMENTATION RATE] IN BLOOD: 52 MM/HR — HIGH (ref 0–10)
GLUCOSE SERPL-MCNC: 99 MG/DL — SIGNIFICANT CHANGE UP (ref 70–99)
HCT VFR BLD CALC: 41 % — LOW (ref 42–52)
HGB BLD-MCNC: 14.3 G/DL — SIGNIFICANT CHANGE UP (ref 14–18)
IMM GRANULOCYTES NFR BLD AUTO: 0.4 % — HIGH (ref 0.1–0.3)
LYMPHOCYTES # BLD AUTO: 19.8 % — LOW (ref 20.5–51.1)
LYMPHOCYTES # BLD AUTO: 2.11 K/UL — SIGNIFICANT CHANGE UP (ref 1.2–3.4)
MCHC RBC-ENTMCNC: 34.2 PG — HIGH (ref 27–31)
MCHC RBC-ENTMCNC: 34.9 G/DL — SIGNIFICANT CHANGE UP (ref 32–37)
MCV RBC AUTO: 98.1 FL — HIGH (ref 80–94)
MONOCYTES # BLD AUTO: 0.84 K/UL — HIGH (ref 0.1–0.6)
MONOCYTES NFR BLD AUTO: 7.9 % — SIGNIFICANT CHANGE UP (ref 1.7–9.3)
NEUTROPHILS # BLD AUTO: 7.4 K/UL — HIGH (ref 1.4–6.5)
NEUTROPHILS NFR BLD AUTO: 69.2 % — SIGNIFICANT CHANGE UP (ref 42.2–75.2)
NRBC # BLD: 0 /100 WBCS — SIGNIFICANT CHANGE UP (ref 0–0)
PLATELET # BLD AUTO: 505 K/UL — HIGH (ref 130–400)
POTASSIUM SERPL-MCNC: 4.3 MMOL/L — SIGNIFICANT CHANGE UP (ref 3.5–5)
POTASSIUM SERPL-SCNC: 4.3 MMOL/L — SIGNIFICANT CHANGE UP (ref 3.5–5)
PROT SERPL-MCNC: 7.7 G/DL — SIGNIFICANT CHANGE UP (ref 6–8)
RBC # BLD: 4.18 M/UL — LOW (ref 4.7–6.1)
RBC # FLD: 17.8 % — HIGH (ref 11.5–14.5)
SODIUM SERPL-SCNC: 137 MMOL/L — SIGNIFICANT CHANGE UP (ref 135–146)
WBC # BLD: 10.68 K/UL — SIGNIFICANT CHANGE UP (ref 4.8–10.8)
WBC # FLD AUTO: 10.68 K/UL — SIGNIFICANT CHANGE UP (ref 4.8–10.8)

## 2018-05-08 RX ADMIN — NALTREXONE HYDROCHLORIDE 50 MILLIGRAM(S): 50 TABLET, FILM COATED ORAL at 08:56

## 2018-05-08 RX ADMIN — GABAPENTIN 300 MILLIGRAM(S): 400 CAPSULE ORAL at 08:57

## 2018-05-08 RX ADMIN — GABAPENTIN 300 MILLIGRAM(S): 400 CAPSULE ORAL at 12:05

## 2018-05-08 RX ADMIN — PANTOPRAZOLE SODIUM 40 MILLIGRAM(S): 20 TABLET, DELAYED RELEASE ORAL at 08:56

## 2018-05-08 RX ADMIN — Medication 100 MILLIGRAM(S): at 08:55

## 2018-05-08 RX ADMIN — Medication 1 MILLIGRAM(S): at 08:55

## 2018-05-08 NOTE — PROGRESS NOTE ADULT - PROVIDER SPECIALTY LIST ADULT
Internal Medicine
Psychiatry
Internal Medicine

## 2018-05-08 NOTE — PROGRESS NOTE ADULT - PROBLEM SELECTOR PLAN 2
continue present treatment as per psych plan as reviewed  Medically stable with no new changes in treatment  will continue to monitor medical status while being treated on psych
Ativan 2mg PO q2h for objective sx of withdrawal and haldol 5mg q6 hours for agitation. Monitor closely for w/d sx.
continue present treatment as per psych plan as reviewed  Medically stable with no new changes in treatment  will continue to monitor medical status while being treated on psych
cotn clonidine and metproplol andgoo dbp
non specific lyndsey long no [facility] stabel for d/c

## 2018-05-08 NOTE — PROGRESS NOTE ADULT - PROBLEM SELECTOR PLAN 1
stable on clonidine
Ativan 2mg PO q2h for objective sx of withdrawal and haldol 5mg q6 hours for agitation.
continue present treatment as per psych plan as reviewed  Medically stable with no new changes in treatment  will continue to monitor medical status while being treated on psych
continue present treatment as per psych plan as reviewed  Medically stable with no new changes in treatment  will continue to monitor medical status while being treated on psych
dre on current tx
outpt f/u and should repat hep screen  liley from [facility]  may need furhter gi f/u as outpt

## 2018-05-08 NOTE — PROGRESS NOTE ADULT - ASSESSMENT
medically stable with no acute issues
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
increaed lfts adn ild elevated paltelets
medically stable with no acute issues
high k

## 2018-05-08 NOTE — PROGRESS NOTE ADULT - PROBLEM SELECTOR PROBLEM 1
HTN (hypertension)
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
HTN (hypertension)
Liver function abnormality
Psychosis, unspecified psychosis type
Psychosis, unspecified psychosis type

## 2018-05-08 NOTE — PROGRESS NOTE ADULT - PROBLEM SELECTOR PROBLEM 2
Psychosis, unspecified psychosis type
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
HTN (hypertension)
Psychosis, unspecified psychosis type
Thrombocytosis

## 2018-05-11 DIAGNOSIS — L30.9 DERMATITIS, UNSPECIFIED: ICD-10-CM

## 2018-05-11 DIAGNOSIS — G47.00 INSOMNIA, UNSPECIFIED: ICD-10-CM

## 2018-05-11 DIAGNOSIS — Z51.89 ENCOUNTER FOR OTHER SPECIFIED AFTERCARE: ICD-10-CM

## 2018-05-11 DIAGNOSIS — F13.29 SEDATIVE, HYPNOTIC OR ANXIOLYTIC DEPENDENCE WITH UNSPECIFIED SEDATIVE, HYPNOTIC OR ANXIOLYTIC-INDUCED DISORDER: ICD-10-CM

## 2018-05-11 DIAGNOSIS — K21.9 GASTRO-ESOPHAGEAL REFLUX DISEASE WITHOUT ESOPHAGITIS: ICD-10-CM

## 2018-05-11 DIAGNOSIS — F32.9 MAJOR DEPRESSIVE DISORDER, SINGLE EPISODE, UNSPECIFIED: ICD-10-CM

## 2018-05-11 DIAGNOSIS — F10.231 ALCOHOL DEPENDENCE WITH WITHDRAWAL DELIRIUM: ICD-10-CM

## 2018-05-11 DIAGNOSIS — F41.9 ANXIETY DISORDER, UNSPECIFIED: ICD-10-CM

## 2018-05-11 DIAGNOSIS — F29 UNSPECIFIED PSYCHOSIS NOT DUE TO A SUBSTANCE OR KNOWN PHYSIOLOGICAL CONDITION: ICD-10-CM

## 2018-05-11 DIAGNOSIS — F10.250 ALCOHOL DEPENDENCE WITH ALCOHOL-INDUCED PSYCHOTIC DISORDER WITH DELUSIONS: ICD-10-CM

## 2018-05-11 DIAGNOSIS — Z88.0 ALLERGY STATUS TO PENICILLIN: ICD-10-CM

## 2018-05-11 DIAGNOSIS — I10 ESSENTIAL (PRIMARY) HYPERTENSION: ICD-10-CM

## 2018-05-31 ENCOUNTER — OUTPATIENT (OUTPATIENT)
Dept: OUTPATIENT SERVICES | Facility: HOSPITAL | Age: 33
LOS: 1 days | Discharge: HOME | End: 2018-05-31

## 2018-05-31 DIAGNOSIS — F13.20 SEDATIVE, HYPNOTIC OR ANXIOLYTIC DEPENDENCE, UNCOMPLICATED: ICD-10-CM

## 2018-05-31 DIAGNOSIS — F10.20 ALCOHOL DEPENDENCE, UNCOMPLICATED: ICD-10-CM

## 2018-05-31 DIAGNOSIS — F32.1 MAJOR DEPRESSIVE DISORDER, SINGLE EPISODE, MODERATE: ICD-10-CM

## 2018-06-08 ENCOUNTER — OUTPATIENT (OUTPATIENT)
Dept: OUTPATIENT SERVICES | Facility: HOSPITAL | Age: 33
LOS: 1 days | Discharge: HOME | End: 2018-06-08

## 2018-06-08 DIAGNOSIS — F10.20 ALCOHOL DEPENDENCE, UNCOMPLICATED: ICD-10-CM

## 2018-06-08 DIAGNOSIS — F13.20 SEDATIVE, HYPNOTIC OR ANXIOLYTIC DEPENDENCE, UNCOMPLICATED: ICD-10-CM

## 2018-06-08 DIAGNOSIS — F32.1 MAJOR DEPRESSIVE DISORDER, SINGLE EPISODE, MODERATE: ICD-10-CM

## 2020-08-25 NOTE — PROGRESS NOTE BEHAVIORAL HEALTH - NSBHADMITIPOBSFT_PSY_A_CORE
[Normal Appearance] : normal appearance
[General Appearance - Well Developed] : well developed
[General Appearance - Well Nourished] : well nourished
[Normal Conjunctiva] : the conjunctiva exhibited no abnormalities
[Normal Oropharynx] : normal oropharynx
[III] : III
[Neck Appearance] : the appearance of the neck was normal
[Heart Rate And Rhythm] : heart rate and rhythm were normal
[Edema] : no peripheral edema present
[Heart Sounds] : normal S1 and S2
[Exaggerated Use Of Accessory Muscles For Inspiration] : no accessory muscle use
[Respiration, Rhythm And Depth] : normal respiratory rhythm and effort
[Auscultation Breath Sounds / Voice Sounds] : lungs were clear to auscultation bilaterally
[Lungs Percussion] : the lungs were normal to percussion
[Nail Clubbing] : no clubbing of the fingernails
[Abnormal Walk] : normal gait
safety
[No Focal Deficits] : no focal deficits
[Oriented To Time, Place, And Person] : oriented to person, place, and time
[Cyanosis, Localized] : no localized cyanosis
[Impaired Insight] : insight and judgment were intact
[Memory Recent] : recent memory was not impaired
[Affect] : the affect was normal
[] : no rash
safety
safety
[FreeTextEntry1] : no chest wall abn
safety

## 2020-10-21 ENCOUNTER — TELEPHONE (OUTPATIENT)
Dept: TRANSPLANT | Facility: CLINIC | Age: 35
End: 2020-10-21

## 2020-10-21 NOTE — TELEPHONE ENCOUNTER
----- Message from Tri Wynn sent at 10/21/2020 11:27 AM CDT -----  Regarding: Referral Questions  Contact: Mckenna [phone]  Pt mother calling to try and establish a doctor for a transplant for her son. Pt is currently at [facility] [place] in [place]. Patient is in need of a liver transplant. Pt is in the hospital now and is battling possible infection which is why he has not been discharged yet. Pt is currently pending Medicaid approval. Pt mom has questions and would like staff to reach out if at all possible in order to figure out the referral process for her son.    Call back: [phone]

## 2020-10-21 NOTE — TELEPHONE ENCOUNTER
Patient's mother call return to give information about the Liver Transplant  Patient is currently in patient .  Patient currently is applying for medicaid in the state of [place].   Questions answered at this time.

## 2020-12-21 NOTE — PATIENT PROFILE BEHAVIORAL HEALTH - NS SC CAGE ALCOHOL ANNOYED YOU
Price (Do Not Change): 0.00 Instructions: This plan will send the code FBSD to the PM system.  DO NOT or CHANGE the price. Detail Level: Simple yes

## 2024-07-15 NOTE — PROGRESS NOTE BEHAVIORAL HEALTH - IMPULSE CONTROL
Chart reviewed. Patient remains an IP at [facility] since 7/4. Initial c/o was CP.    RNCM to follow.  
Normal